# Patient Record
Sex: FEMALE | Race: WHITE | NOT HISPANIC OR LATINO
[De-identification: names, ages, dates, MRNs, and addresses within clinical notes are randomized per-mention and may not be internally consistent; named-entity substitution may affect disease eponyms.]

---

## 2021-01-01 ENCOUNTER — APPOINTMENT (OUTPATIENT)
Dept: INTERVENTIONAL RADIOLOGY/VASCULAR | Facility: CLINIC | Age: 71
End: 2021-01-01

## 2021-01-01 ENCOUNTER — OUTPATIENT (OUTPATIENT)
Dept: OUTPATIENT SERVICES | Facility: HOSPITAL | Age: 71
LOS: 1 days | Discharge: HOME | End: 2021-01-01

## 2021-01-01 ENCOUNTER — APPOINTMENT (OUTPATIENT)
Dept: MULTI SPECIALTY CLINIC | Facility: CLINIC | Age: 71
End: 2021-01-01
Payer: MEDICARE

## 2021-01-01 ENCOUNTER — NON-APPOINTMENT (OUTPATIENT)
Age: 71
End: 2021-01-01

## 2021-01-01 ENCOUNTER — LABORATORY RESULT (OUTPATIENT)
Age: 71
End: 2021-01-01

## 2021-01-01 ENCOUNTER — APPOINTMENT (OUTPATIENT)
Dept: HEPATOLOGY | Facility: CLINIC | Age: 71
End: 2021-01-01
Payer: MEDICARE

## 2021-01-01 VITALS
OXYGEN SATURATION: 98 % | SYSTOLIC BLOOD PRESSURE: 149 MMHG | RESPIRATION RATE: 14 BRPM | TEMPERATURE: 97.1 F | HEIGHT: 64 IN | DIASTOLIC BLOOD PRESSURE: 79 MMHG | HEART RATE: 85 BPM

## 2021-01-01 VITALS
HEART RATE: 84 BPM | WEIGHT: 245.82 LBS | BODY MASS INDEX: 44.1 KG/M2 | RESPIRATION RATE: 16 BRPM | SYSTOLIC BLOOD PRESSURE: 145 MMHG | HEIGHT: 62.6 IN | DIASTOLIC BLOOD PRESSURE: 79 MMHG | TEMPERATURE: 98.1 F | OXYGEN SATURATION: 96 %

## 2021-01-01 DIAGNOSIS — Z87.19 PERSONAL HISTORY OF OTHER DISEASES OF THE DIGESTIVE SYSTEM: ICD-10-CM

## 2021-01-01 DIAGNOSIS — F32.A ANXIETY DISORDER, UNSPECIFIED: ICD-10-CM

## 2021-01-01 DIAGNOSIS — Z87.39 PERSONAL HISTORY OF OTHER DISEASES OF THE MUSCULOSKELETAL SYSTEM AND CONNECTIVE TISSUE: ICD-10-CM

## 2021-01-01 DIAGNOSIS — K76.89 OTHER SPECIFIED DISEASES OF LIVER: ICD-10-CM

## 2021-01-01 DIAGNOSIS — I10 ESSENTIAL (PRIMARY) HYPERTENSION: ICD-10-CM

## 2021-01-01 DIAGNOSIS — Z78.9 OTHER SPECIFIED HEALTH STATUS: ICD-10-CM

## 2021-01-01 DIAGNOSIS — M17.0 BILATERAL PRIMARY OSTEOARTHRITIS OF KNEE: ICD-10-CM

## 2021-01-01 DIAGNOSIS — Z72.89 OTHER PROBLEMS RELATED TO LIFESTYLE: ICD-10-CM

## 2021-01-01 DIAGNOSIS — F41.9 ANXIETY DISORDER, UNSPECIFIED: ICD-10-CM

## 2021-01-01 DIAGNOSIS — M25.569 PAIN IN UNSPECIFIED KNEE: ICD-10-CM

## 2021-01-01 DIAGNOSIS — F32.A DEPRESSION, UNSPECIFIED: ICD-10-CM

## 2021-01-01 LAB
AFP-TM SERPL-MCNC: 19.8 NG/ML
ALBUMIN SERPL ELPH-MCNC: 3.8 G/DL
ALP BLD-CCNC: 149 U/L
ALT SERPL-CCNC: 33 U/L
ANION GAP SERPL CALC-SCNC: 9 MMOL/L
AST SERPL-CCNC: 72 U/L
BASOPHILS # BLD AUTO: 0.03 K/UL
BASOPHILS NFR BLD AUTO: 0.5 %
BILIRUB SERPL-MCNC: 1.1 MG/DL
BUN SERPL-MCNC: 10 MG/DL
CALCIUM SERPL-MCNC: 9.9 MG/DL
CANCER AG19-9 SERPL-ACNC: 59 U/ML
CHLORIDE SERPL-SCNC: 103 MMOL/L
CO2 SERPL-SCNC: 29 MMOL/L
COVID-19 SPIKE DOMAIN ANTIBODY INTERPRETATION: POSITIVE
CREAT SERPL-MCNC: 0.67 MG/DL
EOSINOPHIL # BLD AUTO: 0.07 K/UL
EOSINOPHIL NFR BLD AUTO: 1.3 %
ESTIMATED AVERAGE GLUCOSE: 100 MG/DL
FERRITIN SERPL-MCNC: 309 NG/ML
GGT SERPL-CCNC: 139 U/L
GLUCOSE SERPL-MCNC: 107 MG/DL
HBA1C MFR BLD HPLC: 5.1 %
HBV CORE IGG+IGM SER QL: NONREACTIVE
HBV SURFACE AB SER QL: REACTIVE
HBV SURFACE AG SER QL: NONREACTIVE
HCT VFR BLD CALC: 44.3 %
HCV AB SER QL: NONREACTIVE
HCV S/CO RATIO: 0.15 S/CO
HEPATITIS A IGG ANTIBODY: REACTIVE
HGB BLD-MCNC: 14 G/DL
IMM GRANULOCYTES NFR BLD AUTO: 0.2 %
LYMPHOCYTES # BLD AUTO: 0.97 K/UL
LYMPHOCYTES NFR BLD AUTO: 17.5 %
MAN DIFF?: NORMAL
MCHC RBC-ENTMCNC: 31.6 GM/DL
MCHC RBC-ENTMCNC: 33.3 PG
MCV RBC AUTO: 105.5 FL
MONOCYTES # BLD AUTO: 0.62 K/UL
MONOCYTES NFR BLD AUTO: 11.2 %
NEUTROPHILS # BLD AUTO: 3.84 K/UL
NEUTROPHILS NFR BLD AUTO: 69.3 %
PLATELET # BLD AUTO: 125 K/UL
POTASSIUM SERPL-SCNC: 4.4 MMOL/L
PROT SERPL-MCNC: 6.8 G/DL
RBC # BLD: 4.2 M/UL
RBC # FLD: 14.3 %
SARS-COV-2 AB SERPL IA-ACNC: >250 U/ML
SODIUM SERPL-SCNC: 140 MMOL/L
WBC # FLD AUTO: 5.54 K/UL

## 2021-01-01 PROCEDURE — 99205 OFFICE O/P NEW HI 60 MIN: CPT

## 2021-01-01 PROCEDURE — 99204 OFFICE O/P NEW MOD 45 MIN: CPT

## 2021-01-01 RX ORDER — PEG-3350, SODIUM SULFATE, SODIUM CHLORIDE, POTASSIUM CHLORIDE, SODIUM ASCORBATE AND ASCORBIC ACID 7.5-2.691G
100 KIT ORAL
Qty: 1 | Refills: 0 | Status: DISCONTINUED | COMMUNITY
Start: 2021-01-01 | End: 2021-01-01

## 2021-01-01 RX ORDER — LEVOCETIRIZINE DIHYDROCHLORIDE 5 MG/1
5 TABLET ORAL
Qty: 90 | Refills: 3 | Status: ACTIVE | COMMUNITY
Start: 2021-08-30

## 2021-01-01 RX ORDER — BUPROPION HYDROCHLORIDE 150 MG/1
150 TABLET, EXTENDED RELEASE ORAL
Refills: 0 | Status: ACTIVE | COMMUNITY
Start: 2021-08-06

## 2021-01-01 RX ORDER — DOCUSATE SODIUM 50 MG
50 CAPSULE ORAL
Refills: 0 | Status: ACTIVE | COMMUNITY

## 2021-01-01 RX ORDER — FOLIC ACID 1 MG/1
1 TABLET ORAL
Refills: 0 | Status: ACTIVE | COMMUNITY
Start: 2021-02-26

## 2021-01-01 RX ORDER — HYDROCHLOROTHIAZIDE 12.5 MG/1
12.5 TABLET ORAL DAILY
Refills: 0 | Status: ACTIVE | COMMUNITY
Start: 2021-01-01

## 2021-01-01 RX ORDER — TRAZODONE HYDROCHLORIDE 100 MG/1
100 TABLET ORAL DAILY
Refills: 0 | Status: ACTIVE | COMMUNITY
Start: 2021-08-06

## 2021-01-01 RX ORDER — ERGOCALCIFEROL 1.25 MG/1
1.25 MG CAPSULE, LIQUID FILLED ORAL
Refills: 0 | Status: ACTIVE | COMMUNITY
Start: 2021-10-25

## 2021-12-23 PROBLEM — Z00.00 ENCOUNTER FOR PREVENTIVE HEALTH EXAMINATION: Status: ACTIVE | Noted: 2021-01-01

## 2021-12-27 PROBLEM — Z78.9 HEAVY ALCOHOL USE: Status: ACTIVE | Noted: 2021-01-01

## 2021-12-27 NOTE — HISTORY OF PRESENT ILLNESS
[de-identified] : 70 y/o F w/ hx of obesity, HTN, s/p L knee replacement, debility due to knee issues here for elevated liver enzymes, fatty liver disease, WILEY + ETOH cirrhosis, concern for infiltrative HCC, and portal vein thrombosis.\par \par SH - drinks a glass of wine a night, used to drink a lot more 10-15 years ago > 4-5 drinks of vodka a night. \par FH - father w/ alcohol liver disease but no cancer\par never had EGD, had colonoscopy > 10 years ago.\par \par 11/26/21 BW - AST 64, ALT 34, TB 1.3, Cr 0.64. .6, , AFP 13. \par MRI 12/14/21 - nodular liver in contour, shrunken with caudate hypertrophy c/w hepatic cirrhosis. hepatic hemangioma measuring 1.6 cm in segment 8, suspicious nodule measuring 1.2 cm in segment 8 demonstrating washout on portal venous and equilibrium phase imaging suspicious for hepatoma with restricted diffusion, LIRADS 4/5. Patchy infiltrative arterial phase enhancement throughout segment 5, 8, 4A, 4B measuring 10.7 x 7.4 cm with suggestion of washout on portal venous and equilibrium phase highly suspicious for infiltrative HCC with restricted diffusion, LIRADS-5. main portal vein, RPV, and LPV likely thrombosed. splenorenal varices.

## 2021-12-27 NOTE — ASSESSMENT
[FreeTextEntry1] : 70 y/o F w/ hx of obesity, HTN, s/p L knee replacement, debility due to knee issues here for elevated liver enzymes, fatty liver disease, WILEY + ETOH cirrhosis, concern for infiltrative HCC, and portal vein thrombosis.\par \par PCP - Dr James David\par \par # Infiltrative HCC\par AFP 13, recheck now. Check CA 19-9\par CT chest, NM bone scan ordered\par Upload MRI from 12/2021 from [facility]\par Referral to oncology made by me\par Discuss at tumor board\par Schedule EGD for varices screen\par \par # Portal vein thrombosis, Right/left/main\par Suspect tumor thrombosis\par Defer anticoagulation until further notice\par \par # Cirrhosis Surveillance\par    > Esophageal / gastric varices - schedule egd + colonoscopy for colon cancer screen\par    > Ascites - none\par    > Hepatic encephalopathy - none\par    > HCC screening - see above\par    > Portal vein thrombosis - none\par    > HBV/HAV status - check\par    > Transplant candidate - unlikely due to extensive nature of HCC\par \par RTC 1 month\par \par

## 2021-12-27 NOTE — CONSULT LETTER
[Dear  ___] : Dear  [unfilled], [Consult Letter:] : I had the pleasure of evaluating your patient, [unfilled]. [( Thank you for referring [unfilled] for consultation for _____ )] : Thank you for referring [unfilled] for consultation for [unfilled] [Please see my note below.] : Please see my note below. [Consult Closing:] : Thank you very much for allowing me to participate in the care of this patient.  If you have any questions, please do not hesitate to contact me. [Sincerely,] : Sincerely, [FreeTextEntry2] : James David [FreeTextEntry3] : Dr. Dangelo Kruger MD\par  of Medicine\par [facility]\par [facility] / [facility]\par

## 2021-12-27 NOTE — PHYSICAL EXAM
[General Appearance - Alert] : alert [General Appearance - In No Acute Distress] : in no acute distress [Sclera] : the sclera and conjunctiva were normal [PERRL With Normal Accommodation] : pupils were equal in size, round, and reactive to light [Extraocular Movements] : extraocular movements were intact [Outer Ear] : the ears and nose were normal in appearance [Neck Appearance] : the appearance of the neck was normal [Oropharynx] : the oropharynx was normal [Neck Cervical Mass (___cm)] : no neck mass was observed [Jugular Venous Distention Increased] : there was no jugular-venous distention [Thyroid Diffuse Enlargement] : the thyroid was not enlarged [Thyroid Nodule] : there were no palpable thyroid nodules [Auscultation Breath Sounds / Voice Sounds] : lungs were clear to auscultation bilaterally [Heart Rate And Rhythm] : heart rate was normal and rhythm regular [Heart Sounds] : normal S1 and S2 [Heart Sounds Gallop] : no gallops [Murmurs] : no murmurs [Heart Sounds Pericardial Friction Rub] : no pericardial rub [Bowel Sounds] : normal bowel sounds [Abdomen Soft] : soft [Abdomen Tenderness] : non-tender [Abdomen Mass (___ Cm)] : no abdominal mass palpated [No CVA Tenderness] : no ~M costovertebral angle tenderness [No Spinal Tenderness] : no spinal tenderness [Abnormal Walk] : normal gait [Nail Clubbing] : no clubbing  or cyanosis of the fingernails [Musculoskeletal - Swelling] : no joint swelling seen [Motor Tone] : muscle strength and tone were normal [Skin Color & Pigmentation] : normal skin color and pigmentation [Skin Turgor] : normal skin turgor [] : no rash [Oriented To Time, Place, And Person] : oriented to person, place, and time [Impaired Insight] : insight and judgment were intact [Affect] : the affect was normal [Scleral Icterus] : No Scleral Icterus [Spider Angioma] : No spider angioma(s) were observed [Abdominal  Ascites] : no ascites [Ascites Fluid Wave] : no ascites fluid wave [Ascites Tense] : ascites is not tense [Asterixis] : no asterixis observed [Jaundice] : No jaundice [Depression] : no depression [Hallucinations] : ~T no ~M hallucinations

## 2021-12-30 PROBLEM — F32.A DEPRESSION, UNSPECIFIED DEPRESSION TYPE: Status: ACTIVE | Noted: 2021-01-01

## 2021-12-30 PROBLEM — M17.0 PRIMARY OSTEOARTHRITIS OF BOTH KNEES: Status: ACTIVE | Noted: 2021-01-01

## 2021-12-30 PROBLEM — Z72.89 ALCOHOL USE: Status: ACTIVE | Noted: 2021-01-01

## 2021-12-30 NOTE — REASON FOR VISIT
[Other Location: e.g. School (Enter Location, City,State)___] : at [unfilled], at the time of the visit. [Medical Office: ([facility])___] : at the medical office located in  [Verbal consent obtained from patient] : the patient, [unfilled]

## 2021-12-30 NOTE — PHYSICAL EXAM
[Alert] : alert [Normal Voice/Communication] : normal voice communication [Capable of only limited selfcare, confined to bed or chair more than 50% of waking hours] : Capable of only limited selfcare, confined to bed or chair more than 50% of waking hours

## 2021-12-30 NOTE — ASSESSMENT
[SIRT Procedure & Planning] : SIRT procedure and planning discussed at length [FreeTextEntry1] : This is a 72 y/o F w/ hx of obesity, HTN, s/p L knee replacement, debility due to knee issues here for elevated liver enzymes, fatty liver disease, WILEY + ETOH cirrhosis, PVT, and recent diagnosis of infiltrative HCC who presents today for consultation for liver directed therapy. \par  \par \par 1.  HCC\par - MRI from 12/14/21 demonstrates nodular liver in contour, shrunken with caudate hypertrophy c/w hepatic cirrhosis. Patchy infiltrative arterial phase enhancement throughout segment 5, 8, 4A, 4B measuring 10.7 x 7.4 cm with suggestion of washout on portal venous and equilibrium phase highly suspicious for infiltrative HCC with restricted diffusion, LIRADS-5. main portal vein, RPV, and LPV likely thrombosed. splenorenal varices. \par - imaging reviewed and discussed with pt\par - Recommending treatment with radioembolization \par - I reviewed the procedure, including the mapping angiogram, risks (infection, bleeding, elevated LFTs, VENKAT), benefits, alternatives, and expected post procedure course.\par - I reviewed post procedure radiation safety precautions\par - chest CT, scheduled for 1/5\par - NM bone scan, scheduled for 1/5\par - reviewed PPI, rx sent\par - pt will also need systemic therapy, will plan for IR intervention initially, then she is to f/u with Dr. Brock for further treatment\par - tissue sample requested by oncology to confirm tissue biology and help guide treatment options.  \par - will perform biopsy on day of mapping,  I reviewed the procedure, including the risks, benefits, alternatives, and expected post procedure course.\par - pt requesting I email her more information on the intervention\par \par 2.  PVT\par - likely secondary to tumor burden, a/c deferred by GI at this time\par - continue to follow with Dr. Kruger \par \par I have provided the patient the opportunity to ask questions and have answered them to their satisfaction.  They are encouraged to contact our office with any further questions, concerns, or issues.\par \par \par \par \par I have provided the patient the opportunity to ask questions and have answered them to their satisfaction.  They are encouraged to contact our office with any further questions, concerns, or issues.\par \par

## 2021-12-30 NOTE — HISTORY OF PRESENT ILLNESS
[FreeTextEntry1] : This is 72 y/o F w/ hx of obesity, HTN, s/p L knee replacement, debility due to knee issues here for elevated liver enzymes, fatty liver disease, WILEY + ETOH cirrhosis, concern for infiltrative HCC, and portal vein thrombosis who presents today for consultation for liver directed therapy. \par \par Pt was found to have infiltrative mass on recent imaging.  Was evaluated by Dr. Kruger earlier in week and referred to IR and oncology for treatment.  \par \par Pt planning to start systemic therapy with Dr. Brock in near future. \par \par PCP - Dr James David\par

## 2022-01-01 ENCOUNTER — APPOINTMENT (OUTPATIENT)
Dept: NUCLEAR MEDICINE | Facility: IMAGING CENTER | Age: 72
End: 2022-01-01
Payer: MEDICARE

## 2022-01-01 ENCOUNTER — RESULT REVIEW (OUTPATIENT)
Age: 72
End: 2022-01-01

## 2022-01-01 ENCOUNTER — APPOINTMENT (OUTPATIENT)
Dept: CT IMAGING | Facility: IMAGING CENTER | Age: 72
End: 2022-01-01
Payer: MEDICARE

## 2022-01-01 ENCOUNTER — APPOINTMENT (OUTPATIENT)
Dept: HEMATOLOGY ONCOLOGY | Facility: CLINIC | Age: 72
End: 2022-01-01

## 2022-01-01 ENCOUNTER — APPOINTMENT (OUTPATIENT)
Dept: INFUSION THERAPY | Facility: HOSPITAL | Age: 72
End: 2022-01-01

## 2022-01-01 ENCOUNTER — OUTPATIENT (OUTPATIENT)
Dept: OUTPATIENT SERVICES | Facility: HOSPITAL | Age: 72
LOS: 1 days | Discharge: HOME | End: 2022-01-01
Payer: MEDICARE

## 2022-01-01 ENCOUNTER — APPOINTMENT (OUTPATIENT)
Dept: NUCLEAR MEDICINE | Facility: HOSPITAL | Age: 72
End: 2022-01-01

## 2022-01-01 ENCOUNTER — OUTPATIENT (OUTPATIENT)
Dept: OUTPATIENT SERVICES | Facility: HOSPITAL | Age: 72
LOS: 1 days | Discharge: HOME | End: 2022-01-01

## 2022-01-01 ENCOUNTER — NON-APPOINTMENT (OUTPATIENT)
Age: 72
End: 2022-01-01

## 2022-01-01 ENCOUNTER — OUTPATIENT (OUTPATIENT)
Dept: OUTPATIENT SERVICES | Facility: HOSPITAL | Age: 72
LOS: 1 days | End: 2022-01-01
Payer: MEDICARE

## 2022-01-01 ENCOUNTER — RX RENEWAL (OUTPATIENT)
Age: 72
End: 2022-01-01

## 2022-01-01 ENCOUNTER — APPOINTMENT (OUTPATIENT)
Dept: HEPATOLOGY | Facility: HOSPITAL | Age: 72
End: 2022-01-01

## 2022-01-01 ENCOUNTER — TRANSCRIPTION ENCOUNTER (OUTPATIENT)
Age: 72
End: 2022-01-01

## 2022-01-01 ENCOUNTER — APPOINTMENT (OUTPATIENT)
Dept: MRI IMAGING | Facility: IMAGING CENTER | Age: 72
End: 2022-01-01
Payer: MEDICARE

## 2022-01-01 ENCOUNTER — APPOINTMENT (OUTPATIENT)
Dept: INTERVENTIONAL RADIOLOGY/VASCULAR | Facility: CLINIC | Age: 72
End: 2022-01-01
Payer: MEDICARE

## 2022-01-01 ENCOUNTER — APPOINTMENT (OUTPATIENT)
Dept: CT IMAGING | Facility: IMAGING CENTER | Age: 72
End: 2022-01-01

## 2022-01-01 ENCOUNTER — INPATIENT (INPATIENT)
Facility: HOSPITAL | Age: 72
LOS: 8 days | Discharge: NURSING, REHAB OR LONG-TERM CARE | DRG: 432 | End: 2022-08-02
Attending: INTERNAL MEDICINE | Admitting: INTERNAL MEDICINE
Payer: MEDICARE

## 2022-01-01 ENCOUNTER — RX CHANGE (OUTPATIENT)
Age: 72
End: 2022-01-01

## 2022-01-01 VITALS
RESPIRATION RATE: 16 BRPM | TEMPERATURE: 99 F | SYSTOLIC BLOOD PRESSURE: 145 MMHG | HEIGHT: 64 IN | DIASTOLIC BLOOD PRESSURE: 67 MMHG | HEART RATE: 78 BPM | OXYGEN SATURATION: 94 % | WEIGHT: 139.99 LBS

## 2022-01-01 VITALS
HEART RATE: 85 BPM | SYSTOLIC BLOOD PRESSURE: 116 MMHG | DIASTOLIC BLOOD PRESSURE: 60 MMHG | RESPIRATION RATE: 16 BRPM | TEMPERATURE: 98 F | HEIGHT: 64 IN | WEIGHT: 242.51 LBS | OXYGEN SATURATION: 95 %

## 2022-01-01 VITALS
RESPIRATION RATE: 16 BRPM | HEIGHT: 64 IN | SYSTOLIC BLOOD PRESSURE: 139 MMHG | TEMPERATURE: 98 F | WEIGHT: 250 LBS | OXYGEN SATURATION: 94 % | HEART RATE: 92 BPM | DIASTOLIC BLOOD PRESSURE: 56 MMHG

## 2022-01-01 VITALS
HEART RATE: 79 BPM | DIASTOLIC BLOOD PRESSURE: 78 MMHG | RESPIRATION RATE: 20 BRPM | OXYGEN SATURATION: 98 % | SYSTOLIC BLOOD PRESSURE: 145 MMHG

## 2022-01-01 VITALS
SYSTOLIC BLOOD PRESSURE: 140 MMHG | HEIGHT: 64 IN | RESPIRATION RATE: 18 BRPM | TEMPERATURE: 98 F | DIASTOLIC BLOOD PRESSURE: 70 MMHG | WEIGHT: 240.08 LBS | OXYGEN SATURATION: 94 % | HEART RATE: 76 BPM

## 2022-01-01 VITALS
SYSTOLIC BLOOD PRESSURE: 141 MMHG | RESPIRATION RATE: 18 BRPM | HEART RATE: 94 BPM | OXYGEN SATURATION: 99 % | TEMPERATURE: 97 F | HEIGHT: 64 IN | DIASTOLIC BLOOD PRESSURE: 79 MMHG | WEIGHT: 293 LBS

## 2022-01-01 VITALS
SYSTOLIC BLOOD PRESSURE: 140 MMHG | BODY MASS INDEX: 44.86 KG/M2 | RESPIRATION RATE: 16 BRPM | DIASTOLIC BLOOD PRESSURE: 79 MMHG | HEART RATE: 79 BPM | TEMPERATURE: 97.4 F | OXYGEN SATURATION: 93 % | WEIGHT: 250 LBS

## 2022-01-01 VITALS
HEIGHT: 64 IN | TEMPERATURE: 98 F | SYSTOLIC BLOOD PRESSURE: 140 MMHG | RESPIRATION RATE: 18 BRPM | HEART RATE: 76 BPM | OXYGEN SATURATION: 94 % | DIASTOLIC BLOOD PRESSURE: 70 MMHG | WEIGHT: 240.08 LBS

## 2022-01-01 VITALS
DIASTOLIC BLOOD PRESSURE: 76 MMHG | SYSTOLIC BLOOD PRESSURE: 152 MMHG | OXYGEN SATURATION: 95 % | RESPIRATION RATE: 20 BRPM | HEART RATE: 71 BPM

## 2022-01-01 VITALS
OXYGEN SATURATION: 95 % | SYSTOLIC BLOOD PRESSURE: 119 MMHG | DIASTOLIC BLOOD PRESSURE: 63 MMHG | RESPIRATION RATE: 18 BRPM | TEMPERATURE: 99 F | HEART RATE: 86 BPM

## 2022-01-01 VITALS
WEIGHT: 235.89 LBS | DIASTOLIC BLOOD PRESSURE: 73 MMHG | SYSTOLIC BLOOD PRESSURE: 156 MMHG | OXYGEN SATURATION: 96 % | HEIGHT: 64 IN | RESPIRATION RATE: 16 BRPM | TEMPERATURE: 98 F | HEART RATE: 85 BPM

## 2022-01-01 VITALS
TEMPERATURE: 97.4 F | DIASTOLIC BLOOD PRESSURE: 76 MMHG | SYSTOLIC BLOOD PRESSURE: 134 MMHG | RESPIRATION RATE: 16 BRPM | HEART RATE: 79 BPM | OXYGEN SATURATION: 99 %

## 2022-01-01 VITALS
SYSTOLIC BLOOD PRESSURE: 117 MMHG | OXYGEN SATURATION: 96 % | HEART RATE: 80 BPM | DIASTOLIC BLOOD PRESSURE: 66 MMHG | RESPIRATION RATE: 18 BRPM

## 2022-01-01 DIAGNOSIS — K75.81 NONALCOHOLIC STEATOHEPATITIS (NASH): ICD-10-CM

## 2022-01-01 DIAGNOSIS — Z96.652 PRESENCE OF LEFT ARTIFICIAL KNEE JOINT: Chronic | ICD-10-CM

## 2022-01-01 DIAGNOSIS — C22.0 LIVER CELL CARCINOMA: ICD-10-CM

## 2022-01-01 DIAGNOSIS — R11.2 NAUSEA WITH VOMITING, UNSPECIFIED: ICD-10-CM

## 2022-01-01 DIAGNOSIS — K76.89 OTHER SPECIFIED DISEASES OF LIVER: ICD-10-CM

## 2022-01-01 DIAGNOSIS — Z11.52 ENCOUNTER FOR SCREENING FOR COVID-19: ICD-10-CM

## 2022-01-01 DIAGNOSIS — K74.69 OTHER CIRRHOSIS OF LIVER: ICD-10-CM

## 2022-01-01 DIAGNOSIS — Z51.11 ENCOUNTER FOR ANTINEOPLASTIC CHEMOTHERAPY: ICD-10-CM

## 2022-01-01 DIAGNOSIS — Z01.818 ENCOUNTER FOR OTHER PREPROCEDURAL EXAMINATION: ICD-10-CM

## 2022-01-01 DIAGNOSIS — R17 UNSPECIFIED JAUNDICE: ICD-10-CM

## 2022-01-01 DIAGNOSIS — K76.9 LIVER DISEASE, UNSPECIFIED: ICD-10-CM

## 2022-01-01 DIAGNOSIS — R60.0 LOCALIZED EDEMA: ICD-10-CM

## 2022-01-01 DIAGNOSIS — I81 PORTAL VEIN THROMBOSIS: ICD-10-CM

## 2022-01-01 DIAGNOSIS — Z45.2 ENCOUNTER FOR ADJUSTMENT AND MANAGEMENT OF VASCULAR ACCESS DEVICE: ICD-10-CM

## 2022-01-01 LAB
AFP-TM SERPL-MCNC: 41.5 NG/ML
ALBUMIN SERPL ELPH-MCNC: 2.4 G/DL — LOW (ref 3.3–5)
ALBUMIN SERPL ELPH-MCNC: 2.5 G/DL — LOW (ref 3.3–5)
ALBUMIN SERPL ELPH-MCNC: 2.5 G/DL — LOW (ref 3.3–5)
ALBUMIN SERPL ELPH-MCNC: 2.6 G/DL — LOW (ref 3.3–5)
ALBUMIN SERPL ELPH-MCNC: 2.7 G/DL — LOW (ref 3.3–5)
ALBUMIN SERPL ELPH-MCNC: 2.8 G/DL — LOW (ref 3.3–5)
ALBUMIN SERPL ELPH-MCNC: 3 G/DL — LOW (ref 3.3–5)
ALBUMIN SERPL ELPH-MCNC: 3.2 G/DL — LOW (ref 3.3–5)
ALBUMIN SERPL ELPH-MCNC: 3.3 G/DL
ALBUMIN SERPL ELPH-MCNC: 3.4 G/DL — SIGNIFICANT CHANGE UP (ref 3.3–5)
ALBUMIN SERPL ELPH-MCNC: 3.6 G/DL — SIGNIFICANT CHANGE UP (ref 3.3–5)
ALP BLD-CCNC: 202 U/L
ALP SERPL-CCNC: 142 U/L — HIGH (ref 40–120)
ALP SERPL-CCNC: 151 U/L — HIGH (ref 40–120)
ALP SERPL-CCNC: 169 U/L — HIGH (ref 40–120)
ALP SERPL-CCNC: 174 U/L — HIGH (ref 40–120)
ALP SERPL-CCNC: 175 U/L — HIGH (ref 40–120)
ALP SERPL-CCNC: 175 U/L — HIGH (ref 40–120)
ALP SERPL-CCNC: 191 U/L — HIGH (ref 40–120)
ALP SERPL-CCNC: 197 U/L — HIGH (ref 40–120)
ALP SERPL-CCNC: 202 U/L — HIGH (ref 40–120)
ALP SERPL-CCNC: 218 U/L — HIGH (ref 40–120)
ALT FLD-CCNC: 34 U/L — SIGNIFICANT CHANGE UP (ref 10–45)
ALT FLD-CCNC: 35 U/L — SIGNIFICANT CHANGE UP (ref 10–45)
ALT FLD-CCNC: 36 U/L — SIGNIFICANT CHANGE UP (ref 10–45)
ALT FLD-CCNC: 37 U/L — SIGNIFICANT CHANGE UP (ref 10–45)
ALT FLD-CCNC: 38 U/L — SIGNIFICANT CHANGE UP (ref 10–45)
ALT FLD-CCNC: 41 U/L — SIGNIFICANT CHANGE UP (ref 10–45)
ALT FLD-CCNC: 42 U/L — SIGNIFICANT CHANGE UP (ref 10–45)
ALT FLD-CCNC: 44 U/L — SIGNIFICANT CHANGE UP (ref 10–45)
ALT SERPL-CCNC: 33 U/L
ANION GAP SERPL CALC-SCNC: 10 MMOL/L — SIGNIFICANT CHANGE UP (ref 5–17)
ANION GAP SERPL CALC-SCNC: 10 MMOL/L — SIGNIFICANT CHANGE UP (ref 5–17)
ANION GAP SERPL CALC-SCNC: 11 MMOL/L — SIGNIFICANT CHANGE UP (ref 5–17)
ANION GAP SERPL CALC-SCNC: 12 MMOL/L — SIGNIFICANT CHANGE UP (ref 5–17)
ANION GAP SERPL CALC-SCNC: 12 MMOL/L — SIGNIFICANT CHANGE UP (ref 5–17)
ANION GAP SERPL CALC-SCNC: 13 MMOL/L
ANION GAP SERPL CALC-SCNC: 13 MMOL/L — SIGNIFICANT CHANGE UP (ref 5–17)
ANION GAP SERPL CALC-SCNC: 13 MMOL/L — SIGNIFICANT CHANGE UP (ref 5–17)
ANION GAP SERPL CALC-SCNC: 14 MMOL/L — SIGNIFICANT CHANGE UP (ref 5–17)
ANION GAP SERPL CALC-SCNC: 8 MMOL/L — SIGNIFICANT CHANGE UP (ref 5–17)
ANION GAP SERPL CALC-SCNC: 9 MMOL/L — SIGNIFICANT CHANGE UP (ref 5–17)
ANION GAP SERPL CALC-SCNC: 9 MMOL/L — SIGNIFICANT CHANGE UP (ref 5–17)
APTT BLD: 35.6 SEC — HIGH (ref 27.5–35.5)
APTT BLD: 36.2 SEC — HIGH (ref 27.5–35.5)
APTT BLD: 37.2 SEC
APTT BLD: 39.9 SEC — HIGH (ref 27.5–35.5)
APTT BLD: 40.7 SEC — HIGH (ref 27.5–35.5)
AST SERPL-CCNC: 104 U/L — HIGH (ref 10–40)
AST SERPL-CCNC: 112 U/L
AST SERPL-CCNC: 114 U/L — HIGH (ref 10–40)
AST SERPL-CCNC: 118 U/L — HIGH (ref 10–40)
AST SERPL-CCNC: 118 U/L — HIGH (ref 10–40)
AST SERPL-CCNC: 123 U/L — HIGH (ref 10–40)
AST SERPL-CCNC: 128 U/L — HIGH (ref 10–40)
AST SERPL-CCNC: 135 U/L — HIGH (ref 10–40)
AST SERPL-CCNC: 158 U/L — HIGH (ref 10–40)
AST SERPL-CCNC: 78 U/L — HIGH (ref 10–40)
AST SERPL-CCNC: 83 U/L — HIGH (ref 10–40)
BASOPHILS # BLD AUTO: 0 K/UL — SIGNIFICANT CHANGE UP (ref 0–0.2)
BASOPHILS # BLD AUTO: 0.03 K/UL — SIGNIFICANT CHANGE UP (ref 0–0.2)
BASOPHILS # BLD AUTO: 0.03 K/UL — SIGNIFICANT CHANGE UP (ref 0–0.2)
BASOPHILS # BLD AUTO: 0.04 K/UL — SIGNIFICANT CHANGE UP (ref 0–0.2)
BASOPHILS # BLD AUTO: 0.04 K/UL — SIGNIFICANT CHANGE UP (ref 0–0.2)
BASOPHILS # BLD AUTO: 0.05 K/UL — SIGNIFICANT CHANGE UP (ref 0–0.2)
BASOPHILS NFR BLD AUTO: 0 % — SIGNIFICANT CHANGE UP (ref 0–2)
BASOPHILS NFR BLD AUTO: 0.4 % — SIGNIFICANT CHANGE UP (ref 0–2)
BASOPHILS NFR BLD AUTO: 0.4 % — SIGNIFICANT CHANGE UP (ref 0–2)
BASOPHILS NFR BLD AUTO: 0.5 % — SIGNIFICANT CHANGE UP (ref 0–2)
BILIRUB SERPL-MCNC: 1.1 MG/DL — SIGNIFICANT CHANGE UP (ref 0.2–1.2)
BILIRUB SERPL-MCNC: 1.8 MG/DL — HIGH (ref 0.2–1.2)
BILIRUB SERPL-MCNC: 2.4 MG/DL
BILIRUB SERPL-MCNC: 3 MG/DL — HIGH (ref 0.2–1.2)
BILIRUB SERPL-MCNC: 3.2 MG/DL — HIGH (ref 0.2–1.2)
BILIRUB SERPL-MCNC: 3.7 MG/DL — HIGH (ref 0.2–1.2)
BILIRUB SERPL-MCNC: 4.2 MG/DL — HIGH (ref 0.2–1.2)
BILIRUB SERPL-MCNC: 4.4 MG/DL — HIGH (ref 0.2–1.2)
BILIRUB SERPL-MCNC: 4.4 MG/DL — HIGH (ref 0.2–1.2)
BILIRUB SERPL-MCNC: 4.8 MG/DL — HIGH (ref 0.2–1.2)
BILIRUB SERPL-MCNC: 5.7 MG/DL — HIGH (ref 0.2–1.2)
BILIRUB SERPL-MCNC: 7 MG/DL — HIGH (ref 0.2–1.2)
BLD GP AB SCN SERPL QL: NEGATIVE — SIGNIFICANT CHANGE UP
BUN SERPL-MCNC: 10 MG/DL — SIGNIFICANT CHANGE UP (ref 7–23)
BUN SERPL-MCNC: 10 MG/DL — SIGNIFICANT CHANGE UP (ref 7–23)
BUN SERPL-MCNC: 11 MG/DL
BUN SERPL-MCNC: 12 MG/DL — SIGNIFICANT CHANGE UP (ref 7–23)
BUN SERPL-MCNC: 12 MG/DL — SIGNIFICANT CHANGE UP (ref 7–23)
BUN SERPL-MCNC: 13 MG/DL — SIGNIFICANT CHANGE UP (ref 7–23)
BUN SERPL-MCNC: 13 MG/DL — SIGNIFICANT CHANGE UP (ref 7–23)
BUN SERPL-MCNC: 14 MG/DL — SIGNIFICANT CHANGE UP (ref 7–23)
BUN SERPL-MCNC: 14 MG/DL — SIGNIFICANT CHANGE UP (ref 7–23)
BUN SERPL-MCNC: 15 MG/DL — SIGNIFICANT CHANGE UP (ref 7–23)
BUN SERPL-MCNC: 21 MG/DL — SIGNIFICANT CHANGE UP (ref 7–23)
BUN SERPL-MCNC: 22 MG/DL — SIGNIFICANT CHANGE UP (ref 7–23)
CALCIUM SERPL-MCNC: 8 MG/DL — LOW (ref 8.4–10.5)
CALCIUM SERPL-MCNC: 8.1 MG/DL — LOW (ref 8.4–10.5)
CALCIUM SERPL-MCNC: 8.3 MG/DL — LOW (ref 8.4–10.5)
CALCIUM SERPL-MCNC: 8.4 MG/DL — SIGNIFICANT CHANGE UP (ref 8.4–10.5)
CALCIUM SERPL-MCNC: 8.5 MG/DL — SIGNIFICANT CHANGE UP (ref 8.4–10.5)
CALCIUM SERPL-MCNC: 8.5 MG/DL — SIGNIFICANT CHANGE UP (ref 8.4–10.5)
CALCIUM SERPL-MCNC: 8.6 MG/DL — SIGNIFICANT CHANGE UP (ref 8.4–10.5)
CALCIUM SERPL-MCNC: 8.6 MG/DL — SIGNIFICANT CHANGE UP (ref 8.4–10.5)
CALCIUM SERPL-MCNC: 8.8 MG/DL — SIGNIFICANT CHANGE UP (ref 8.4–10.5)
CALCIUM SERPL-MCNC: 8.8 MG/DL — SIGNIFICANT CHANGE UP (ref 8.4–10.5)
CALCIUM SERPL-MCNC: 9.1 MG/DL — SIGNIFICANT CHANGE UP (ref 8.4–10.5)
CALCIUM SERPL-MCNC: 9.1 MG/DL — SIGNIFICANT CHANGE UP (ref 8.4–10.5)
CALCIUM SERPL-MCNC: 9.6 MG/DL
CALCIUM SERPL-MCNC: 9.6 MG/DL — SIGNIFICANT CHANGE UP (ref 8.4–10.5)
CHLORIDE SERPL-SCNC: 100 MMOL/L — SIGNIFICANT CHANGE UP (ref 96–108)
CHLORIDE SERPL-SCNC: 102 MMOL/L
CHLORIDE SERPL-SCNC: 102 MMOL/L — SIGNIFICANT CHANGE UP (ref 96–108)
CHLORIDE SERPL-SCNC: 105 MMOL/L — SIGNIFICANT CHANGE UP (ref 96–108)
CHLORIDE SERPL-SCNC: 93 MMOL/L — LOW (ref 96–108)
CHLORIDE SERPL-SCNC: 95 MMOL/L — LOW (ref 96–108)
CHLORIDE SERPL-SCNC: 96 MMOL/L — SIGNIFICANT CHANGE UP (ref 96–108)
CHLORIDE SERPL-SCNC: 96 MMOL/L — SIGNIFICANT CHANGE UP (ref 96–108)
CHLORIDE SERPL-SCNC: 97 MMOL/L — SIGNIFICANT CHANGE UP (ref 96–108)
CHLORIDE SERPL-SCNC: 98 MMOL/L — SIGNIFICANT CHANGE UP (ref 96–108)
CHLORIDE SERPL-SCNC: 99 MMOL/L — SIGNIFICANT CHANGE UP (ref 96–108)
CO2 SERPL-SCNC: 22 MMOL/L — SIGNIFICANT CHANGE UP (ref 22–31)
CO2 SERPL-SCNC: 23 MMOL/L — SIGNIFICANT CHANGE UP (ref 22–31)
CO2 SERPL-SCNC: 24 MMOL/L
CO2 SERPL-SCNC: 24 MMOL/L — SIGNIFICANT CHANGE UP (ref 22–31)
CO2 SERPL-SCNC: 25 MMOL/L — SIGNIFICANT CHANGE UP (ref 22–31)
CO2 SERPL-SCNC: 26 MMOL/L — SIGNIFICANT CHANGE UP (ref 22–31)
CO2 SERPL-SCNC: 27 MMOL/L — SIGNIFICANT CHANGE UP (ref 22–31)
CO2 SERPL-SCNC: 27 MMOL/L — SIGNIFICANT CHANGE UP (ref 22–31)
CO2 SERPL-SCNC: 31 MMOL/L — SIGNIFICANT CHANGE UP (ref 22–31)
CO2 SERPL-SCNC: 31 MMOL/L — SIGNIFICANT CHANGE UP (ref 22–31)
CO2 SERPL-SCNC: 32 MMOL/L — HIGH (ref 22–31)
CO2 SERPL-SCNC: 35 MMOL/L — HIGH (ref 22–31)
CREAT SERPL-MCNC: 0.57 MG/DL — SIGNIFICANT CHANGE UP (ref 0.5–1.3)
CREAT SERPL-MCNC: 0.59 MG/DL — SIGNIFICANT CHANGE UP (ref 0.5–1.3)
CREAT SERPL-MCNC: 0.6 MG/DL — SIGNIFICANT CHANGE UP (ref 0.5–1.3)
CREAT SERPL-MCNC: 0.6 MG/DL — SIGNIFICANT CHANGE UP (ref 0.5–1.3)
CREAT SERPL-MCNC: 0.62 MG/DL
CREAT SERPL-MCNC: 0.62 MG/DL — SIGNIFICANT CHANGE UP (ref 0.5–1.3)
CREAT SERPL-MCNC: 0.62 MG/DL — SIGNIFICANT CHANGE UP (ref 0.5–1.3)
CREAT SERPL-MCNC: 0.63 MG/DL — SIGNIFICANT CHANGE UP (ref 0.5–1.3)
CREAT SERPL-MCNC: 0.63 MG/DL — SIGNIFICANT CHANGE UP (ref 0.5–1.3)
CREAT SERPL-MCNC: 0.64 MG/DL — SIGNIFICANT CHANGE UP (ref 0.5–1.3)
CREAT SERPL-MCNC: 0.67 MG/DL — SIGNIFICANT CHANGE UP (ref 0.5–1.3)
CREAT SERPL-MCNC: 0.67 MG/DL — SIGNIFICANT CHANGE UP (ref 0.5–1.3)
CREAT SERPL-MCNC: 0.7 MG/DL — SIGNIFICANT CHANGE UP (ref 0.5–1.3)
CREAT SERPL-MCNC: 0.83 MG/DL — SIGNIFICANT CHANGE UP (ref 0.5–1.3)
CREAT SERPL-MCNC: 0.87 MG/DL — SIGNIFICANT CHANGE UP (ref 0.5–1.3)
EGFR: 72 ML/MIN/1.73M2 — SIGNIFICANT CHANGE UP
EGFR: 76 ML/MIN/1.73M2 — SIGNIFICANT CHANGE UP
EGFR: 94 ML/MIN/1.73M2 — SIGNIFICANT CHANGE UP
EGFR: 95 ML/MIN/1.73M2 — SIGNIFICANT CHANGE UP
EGFR: 96 ML/MIN/1.73M2
EGFR: 96 ML/MIN/1.73M2 — SIGNIFICANT CHANGE UP
EGFR: 97 ML/MIN/1.73M2 — SIGNIFICANT CHANGE UP
EGFR: 98 ML/MIN/1.73M2 — SIGNIFICANT CHANGE UP
EOSINOPHIL # BLD AUTO: 0.07 K/UL — SIGNIFICANT CHANGE UP (ref 0–0.5)
EOSINOPHIL # BLD AUTO: 0.07 K/UL — SIGNIFICANT CHANGE UP (ref 0–0.5)
EOSINOPHIL # BLD AUTO: 0.08 K/UL — SIGNIFICANT CHANGE UP (ref 0–0.5)
EOSINOPHIL NFR BLD AUTO: 0.9 % — SIGNIFICANT CHANGE UP (ref 0–6)
EOSINOPHIL NFR BLD AUTO: 1 % — SIGNIFICANT CHANGE UP (ref 0–6)
EOSINOPHIL NFR BLD AUTO: 1 % — SIGNIFICANT CHANGE UP (ref 0–6)
EOSINOPHIL NFR BLD AUTO: 1.1 % — SIGNIFICANT CHANGE UP (ref 0–6)
GLUCOSE BLDC GLUCOMTR-MCNC: 117 MG/DL — HIGH (ref 70–99)
GLUCOSE SERPL-MCNC: 105 MG/DL — HIGH (ref 70–99)
GLUCOSE SERPL-MCNC: 107 MG/DL — HIGH (ref 70–99)
GLUCOSE SERPL-MCNC: 124 MG/DL — HIGH (ref 70–99)
GLUCOSE SERPL-MCNC: 125 MG/DL
GLUCOSE SERPL-MCNC: 130 MG/DL — HIGH (ref 70–99)
GLUCOSE SERPL-MCNC: 132 MG/DL — HIGH (ref 70–99)
GLUCOSE SERPL-MCNC: 136 MG/DL — HIGH (ref 70–99)
GLUCOSE SERPL-MCNC: 142 MG/DL — HIGH (ref 70–99)
GLUCOSE SERPL-MCNC: 81 MG/DL — SIGNIFICANT CHANGE UP (ref 70–99)
GLUCOSE SERPL-MCNC: 87 MG/DL — SIGNIFICANT CHANGE UP (ref 70–99)
GLUCOSE SERPL-MCNC: 90 MG/DL — SIGNIFICANT CHANGE UP (ref 70–99)
GLUCOSE SERPL-MCNC: 92 MG/DL — SIGNIFICANT CHANGE UP (ref 70–99)
GLUCOSE SERPL-MCNC: 97 MG/DL — SIGNIFICANT CHANGE UP (ref 70–99)
GLUCOSE SERPL-MCNC: 97 MG/DL — SIGNIFICANT CHANGE UP (ref 70–99)
HCT VFR BLD CALC: 35 % — SIGNIFICANT CHANGE UP (ref 34.5–45)
HCT VFR BLD CALC: 39.3 % — SIGNIFICANT CHANGE UP (ref 34.5–45)
HCT VFR BLD CALC: 39.4 % — SIGNIFICANT CHANGE UP (ref 34.5–45)
HCT VFR BLD CALC: 39.7 % — SIGNIFICANT CHANGE UP (ref 34.5–45)
HCT VFR BLD CALC: 40 % — SIGNIFICANT CHANGE UP (ref 34.5–45)
HCT VFR BLD CALC: 40.1 % — SIGNIFICANT CHANGE UP (ref 34.5–45)
HCT VFR BLD CALC: 40.5 % — SIGNIFICANT CHANGE UP (ref 34.5–45)
HCT VFR BLD CALC: 40.8 % — SIGNIFICANT CHANGE UP (ref 34.5–45)
HCT VFR BLD CALC: 42.1 % — SIGNIFICANT CHANGE UP (ref 34.5–45)
HCT VFR BLD CALC: 42.3 % — SIGNIFICANT CHANGE UP (ref 34.5–45)
HCT VFR BLD CALC: 42.5 % — SIGNIFICANT CHANGE UP (ref 34.5–45)
HCT VFR BLD CALC: 42.7 % — SIGNIFICANT CHANGE UP (ref 34.5–45)
HCT VFR BLD CALC: 44.6 % — SIGNIFICANT CHANGE UP (ref 34.5–45)
HCV AB S/CO SERPL IA: 0.14 S/CO — SIGNIFICANT CHANGE UP (ref 0–0.99)
HCV AB SERPL-IMP: SIGNIFICANT CHANGE UP
HGB BLD-MCNC: 12 G/DL — SIGNIFICANT CHANGE UP (ref 11.5–15.5)
HGB BLD-MCNC: 13.2 G/DL — SIGNIFICANT CHANGE UP (ref 11.5–15.5)
HGB BLD-MCNC: 13.3 G/DL — SIGNIFICANT CHANGE UP (ref 11.5–15.5)
HGB BLD-MCNC: 13.3 G/DL — SIGNIFICANT CHANGE UP (ref 11.5–15.5)
HGB BLD-MCNC: 13.4 G/DL — SIGNIFICANT CHANGE UP (ref 11.5–15.5)
HGB BLD-MCNC: 13.6 G/DL — SIGNIFICANT CHANGE UP (ref 11.5–15.5)
HGB BLD-MCNC: 13.7 G/DL — SIGNIFICANT CHANGE UP (ref 11.5–15.5)
HGB BLD-MCNC: 13.7 G/DL — SIGNIFICANT CHANGE UP (ref 11.5–15.5)
HGB BLD-MCNC: 13.8 G/DL — SIGNIFICANT CHANGE UP (ref 11.5–15.5)
HGB BLD-MCNC: 13.9 G/DL — SIGNIFICANT CHANGE UP (ref 11.5–15.5)
HGB BLD-MCNC: 14 G/DL — SIGNIFICANT CHANGE UP (ref 11.5–15.5)
HGB BLD-MCNC: 14.2 G/DL — SIGNIFICANT CHANGE UP (ref 11.5–15.5)
HGB BLD-MCNC: 15 G/DL — SIGNIFICANT CHANGE UP (ref 11.5–15.5)
IMM GRANULOCYTES NFR BLD AUTO: 0.3 % — SIGNIFICANT CHANGE UP (ref 0–1.5)
IMM GRANULOCYTES NFR BLD AUTO: 0.3 % — SIGNIFICANT CHANGE UP (ref 0–1.5)
IMM GRANULOCYTES NFR BLD AUTO: 0.4 % — SIGNIFICANT CHANGE UP (ref 0–1.5)
IMM GRANULOCYTES NFR BLD AUTO: 0.4 % — SIGNIFICANT CHANGE UP (ref 0–1.5)
IMM GRANULOCYTES NFR BLD AUTO: 0.7 % — SIGNIFICANT CHANGE UP (ref 0–1.5)
INR BLD: 1.12 RATIO — SIGNIFICANT CHANGE UP (ref 0.88–1.16)
INR BLD: 1.14 RATIO — SIGNIFICANT CHANGE UP (ref 0.88–1.16)
INR BLD: 1.27 RATIO — HIGH (ref 0.88–1.16)
INR BLD: 1.32 RATIO — HIGH (ref 0.88–1.16)
INR BLD: 1.33 RATIO — HIGH (ref 0.88–1.16)
INR PPP: 1.2 RATIO
LYMPHOCYTES # BLD AUTO: 0.5 K/UL — LOW (ref 1–3.3)
LYMPHOCYTES # BLD AUTO: 0.56 K/UL — LOW (ref 1–3.3)
LYMPHOCYTES # BLD AUTO: 0.63 K/UL — LOW (ref 1–3.3)
LYMPHOCYTES # BLD AUTO: 0.66 K/UL — LOW (ref 1–3.3)
LYMPHOCYTES # BLD AUTO: 0.69 K/UL — LOW (ref 1–3.3)
LYMPHOCYTES # BLD AUTO: 0.72 K/UL — LOW (ref 1–3.3)
LYMPHOCYTES # BLD AUTO: 6.9 % — LOW (ref 13–44)
LYMPHOCYTES # BLD AUTO: 6.9 % — LOW (ref 13–44)
LYMPHOCYTES # BLD AUTO: 7.8 % — LOW (ref 13–44)
LYMPHOCYTES # BLD AUTO: 8 % — LOW (ref 13–44)
LYMPHOCYTES # BLD AUTO: 8.7 % — LOW (ref 13–44)
LYMPHOCYTES # BLD AUTO: 9 % — LOW (ref 13–44)
MACROCYTES BLD QL: SLIGHT — SIGNIFICANT CHANGE UP
MAGNESIUM SERPL-MCNC: 1.9 MG/DL — SIGNIFICANT CHANGE UP (ref 1.6–2.6)
MAGNESIUM SERPL-MCNC: 2 MG/DL — SIGNIFICANT CHANGE UP (ref 1.6–2.6)
MANUAL SMEAR VERIFICATION: SIGNIFICANT CHANGE UP
MCHC RBC-ENTMCNC: 32.3 GM/DL — SIGNIFICANT CHANGE UP (ref 32–36)
MCHC RBC-ENTMCNC: 32.5 GM/DL — SIGNIFICANT CHANGE UP (ref 32–36)
MCHC RBC-ENTMCNC: 33.1 G/DL — SIGNIFICANT CHANGE UP (ref 32–36)
MCHC RBC-ENTMCNC: 33.1 PG — SIGNIFICANT CHANGE UP (ref 27–34)
MCHC RBC-ENTMCNC: 33.2 GM/DL — SIGNIFICANT CHANGE UP (ref 32–36)
MCHC RBC-ENTMCNC: 33.4 G/DL — SIGNIFICANT CHANGE UP (ref 32–36)
MCHC RBC-ENTMCNC: 33.4 GM/DL — SIGNIFICANT CHANGE UP (ref 32–36)
MCHC RBC-ENTMCNC: 33.6 G/DL — SIGNIFICANT CHANGE UP (ref 32–36)
MCHC RBC-ENTMCNC: 33.6 G/DL — SIGNIFICANT CHANGE UP (ref 32–36)
MCHC RBC-ENTMCNC: 33.7 G/DL — SIGNIFICANT CHANGE UP (ref 32–36)
MCHC RBC-ENTMCNC: 33.8 GM/DL — SIGNIFICANT CHANGE UP (ref 32–36)
MCHC RBC-ENTMCNC: 33.8 GM/DL — SIGNIFICANT CHANGE UP (ref 32–36)
MCHC RBC-ENTMCNC: 33.8 PG — SIGNIFICANT CHANGE UP (ref 27–34)
MCHC RBC-ENTMCNC: 34.1 GM/DL — SIGNIFICANT CHANGE UP (ref 32–36)
MCHC RBC-ENTMCNC: 34.3 GM/DL — SIGNIFICANT CHANGE UP (ref 32–36)
MCHC RBC-ENTMCNC: 34.8 PG — HIGH (ref 27–34)
MCHC RBC-ENTMCNC: 35.1 PG — HIGH (ref 27–34)
MCHC RBC-ENTMCNC: 35.1 PG — HIGH (ref 27–34)
MCHC RBC-ENTMCNC: 35.6 PG — HIGH (ref 27–34)
MCHC RBC-ENTMCNC: 35.7 PG — HIGH (ref 27–34)
MCHC RBC-ENTMCNC: 35.8 PG — HIGH (ref 27–34)
MCHC RBC-ENTMCNC: 36.1 PG — HIGH (ref 27–34)
MCHC RBC-ENTMCNC: 36.3 PG — HIGH (ref 27–34)
MCHC RBC-ENTMCNC: 36.4 PG — HIGH (ref 27–34)
MCV RBC AUTO: 101.5 FL — HIGH (ref 80–100)
MCV RBC AUTO: 101.7 FL — HIGH (ref 80–100)
MCV RBC AUTO: 105 FL — HIGH (ref 80–100)
MCV RBC AUTO: 105.2 FL — HIGH (ref 80–100)
MCV RBC AUTO: 105.6 FL — HIGH (ref 80–100)
MCV RBC AUTO: 105.9 FL — HIGH (ref 80–100)
MCV RBC AUTO: 106 FL — HIGH (ref 80–100)
MCV RBC AUTO: 106.3 FL — HIGH (ref 80–100)
MCV RBC AUTO: 106.4 FL — HIGH (ref 80–100)
MCV RBC AUTO: 106.5 FL — HIGH (ref 80–100)
MCV RBC AUTO: 107.2 FL — HIGH (ref 80–100)
MCV RBC AUTO: 107.6 FL — HIGH (ref 80–100)
MCV RBC AUTO: 107.7 FL — HIGH (ref 80–100)
MELD SCORE WITH DIALYSIS: 28 POINTS — SIGNIFICANT CHANGE UP
MELD SCORE WITHOUT DIALYSIS: 14 POINTS — SIGNIFICANT CHANGE UP
MONOCYTES # BLD AUTO: 0.63 K/UL — SIGNIFICANT CHANGE UP (ref 0–0.9)
MONOCYTES # BLD AUTO: 0.68 K/UL — SIGNIFICANT CHANGE UP (ref 0–0.9)
MONOCYTES # BLD AUTO: 0.81 K/UL — SIGNIFICANT CHANGE UP (ref 0–0.9)
MONOCYTES # BLD AUTO: 0.85 K/UL — SIGNIFICANT CHANGE UP (ref 0–0.9)
MONOCYTES # BLD AUTO: 0.85 K/UL — SIGNIFICANT CHANGE UP (ref 0–0.9)
MONOCYTES # BLD AUTO: 0.95 K/UL — HIGH (ref 0–0.9)
MONOCYTES NFR BLD AUTO: 10.7 % — SIGNIFICANT CHANGE UP (ref 2–14)
MONOCYTES NFR BLD AUTO: 12 % — SIGNIFICANT CHANGE UP (ref 2–14)
MONOCYTES NFR BLD AUTO: 8.8 % — SIGNIFICANT CHANGE UP (ref 2–14)
MONOCYTES NFR BLD AUTO: 9.3 % — SIGNIFICANT CHANGE UP (ref 2–14)
MONOCYTES NFR BLD AUTO: 9.4 % — SIGNIFICANT CHANGE UP (ref 2–14)
MONOCYTES NFR BLD AUTO: 9.9 % — SIGNIFICANT CHANGE UP (ref 2–14)
NEUTROPHILS # BLD AUTO: 5.81 K/UL — SIGNIFICANT CHANGE UP (ref 1.8–7.4)
NEUTROPHILS # BLD AUTO: 5.95 K/UL — SIGNIFICANT CHANGE UP (ref 1.8–7.4)
NEUTROPHILS # BLD AUTO: 5.98 K/UL — SIGNIFICANT CHANGE UP (ref 1.8–7.4)
NEUTROPHILS # BLD AUTO: 6.2 K/UL — SIGNIFICANT CHANGE UP (ref 1.8–7.4)
NEUTROPHILS # BLD AUTO: 6.91 K/UL — SIGNIFICANT CHANGE UP (ref 1.8–7.4)
NEUTROPHILS # BLD AUTO: 7.47 K/UL — HIGH (ref 1.8–7.4)
NEUTROPHILS NFR BLD AUTO: 78 % — HIGH (ref 43–77)
NEUTROPHILS NFR BLD AUTO: 78.8 % — HIGH (ref 43–77)
NEUTROPHILS NFR BLD AUTO: 80.4 % — HIGH (ref 43–77)
NEUTROPHILS NFR BLD AUTO: 81.5 % — HIGH (ref 43–77)
NEUTROPHILS NFR BLD AUTO: 81.7 % — HIGH (ref 43–77)
NEUTROPHILS NFR BLD AUTO: 82 % — HIGH (ref 43–77)
NON-GYNECOLOGICAL CYTOLOGY STUDY: SIGNIFICANT CHANGE UP
NRBC # BLD: 0 /100 WBCS — SIGNIFICANT CHANGE UP (ref 0–0)
NRBC # BLD: 0 /100 — SIGNIFICANT CHANGE UP (ref 0–0)
NT-PROBNP SERPL-SCNC: 207 PG/ML — SIGNIFICANT CHANGE UP (ref 0–300)
PHOSPHATIDYETHANOL (PETH), WHOLE BLOOD: 83 NG/ML
PLAT MORPH BLD: NORMAL — SIGNIFICANT CHANGE UP
PLATELET # BLD AUTO: 103 K/UL — LOW (ref 150–400)
PLATELET # BLD AUTO: 104 K/UL — LOW (ref 150–400)
PLATELET # BLD AUTO: 106 K/UL — LOW (ref 150–400)
PLATELET # BLD AUTO: 118 K/UL — LOW (ref 150–400)
PLATELET # BLD AUTO: 119 K/UL — LOW (ref 150–400)
PLATELET # BLD AUTO: 119 K/UL — LOW (ref 150–400)
PLATELET # BLD AUTO: 121 K/UL — LOW (ref 150–400)
PLATELET # BLD AUTO: 129 K/UL — LOW (ref 150–400)
PLATELET # BLD AUTO: 129 K/UL — LOW (ref 150–400)
PLATELET # BLD AUTO: 133 K/UL — LOW (ref 150–400)
PLATELET # BLD AUTO: 133 K/UL — LOW (ref 150–400)
PLATELET # BLD AUTO: 135 K/UL — LOW (ref 150–400)
PLATELET # BLD AUTO: 141 K/UL — LOW (ref 150–400)
POLYCHROMASIA BLD QL SMEAR: SLIGHT — SIGNIFICANT CHANGE UP
POTASSIUM SERPL-MCNC: 2.8 MMOL/L — CRITICAL LOW (ref 3.5–5.3)
POTASSIUM SERPL-MCNC: 3.1 MMOL/L — LOW (ref 3.5–5.3)
POTASSIUM SERPL-MCNC: 3.5 MMOL/L — SIGNIFICANT CHANGE UP (ref 3.5–5.3)
POTASSIUM SERPL-MCNC: 3.8 MMOL/L — SIGNIFICANT CHANGE UP (ref 3.5–5.3)
POTASSIUM SERPL-MCNC: 4 MMOL/L — SIGNIFICANT CHANGE UP (ref 3.5–5.3)
POTASSIUM SERPL-MCNC: 4.1 MMOL/L — SIGNIFICANT CHANGE UP (ref 3.5–5.3)
POTASSIUM SERPL-MCNC: 4.1 MMOL/L — SIGNIFICANT CHANGE UP (ref 3.5–5.3)
POTASSIUM SERPL-MCNC: 4.2 MMOL/L — SIGNIFICANT CHANGE UP (ref 3.5–5.3)
POTASSIUM SERPL-MCNC: 4.8 MMOL/L — SIGNIFICANT CHANGE UP (ref 3.5–5.3)
POTASSIUM SERPL-MCNC: 5.1 MMOL/L — SIGNIFICANT CHANGE UP (ref 3.5–5.3)
POTASSIUM SERPL-MCNC: 5.2 MMOL/L — SIGNIFICANT CHANGE UP (ref 3.5–5.3)
POTASSIUM SERPL-SCNC: 2.8 MMOL/L — CRITICAL LOW (ref 3.5–5.3)
POTASSIUM SERPL-SCNC: 3.1 MMOL/L — LOW (ref 3.5–5.3)
POTASSIUM SERPL-SCNC: 3.5 MMOL/L — SIGNIFICANT CHANGE UP (ref 3.5–5.3)
POTASSIUM SERPL-SCNC: 3.8 MMOL/L — SIGNIFICANT CHANGE UP (ref 3.5–5.3)
POTASSIUM SERPL-SCNC: 4 MMOL/L — SIGNIFICANT CHANGE UP (ref 3.5–5.3)
POTASSIUM SERPL-SCNC: 4.1 MMOL/L
POTASSIUM SERPL-SCNC: 4.1 MMOL/L — SIGNIFICANT CHANGE UP (ref 3.5–5.3)
POTASSIUM SERPL-SCNC: 4.1 MMOL/L — SIGNIFICANT CHANGE UP (ref 3.5–5.3)
POTASSIUM SERPL-SCNC: 4.2 MMOL/L — SIGNIFICANT CHANGE UP (ref 3.5–5.3)
POTASSIUM SERPL-SCNC: 4.8 MMOL/L — SIGNIFICANT CHANGE UP (ref 3.5–5.3)
POTASSIUM SERPL-SCNC: 5.1 MMOL/L — SIGNIFICANT CHANGE UP (ref 3.5–5.3)
POTASSIUM SERPL-SCNC: 5.2 MMOL/L — SIGNIFICANT CHANGE UP (ref 3.5–5.3)
PROT SERPL-MCNC: 6 G/DL — SIGNIFICANT CHANGE UP (ref 6–8.3)
PROT SERPL-MCNC: 6.1 G/DL — SIGNIFICANT CHANGE UP (ref 6–8.3)
PROT SERPL-MCNC: 6.2 G/DL — SIGNIFICANT CHANGE UP (ref 6–8.3)
PROT SERPL-MCNC: 6.3 G/DL — SIGNIFICANT CHANGE UP (ref 6–8.3)
PROT SERPL-MCNC: 6.7 G/DL
PROT SERPL-MCNC: 6.7 G/DL — SIGNIFICANT CHANGE UP (ref 6–8.3)
PROT SERPL-MCNC: 6.7 G/DL — SIGNIFICANT CHANGE UP (ref 6–8.3)
PROT SERPL-MCNC: 6.9 G/DL — SIGNIFICANT CHANGE UP (ref 6–8.3)
PROT SERPL-MCNC: 7.1 G/DL — SIGNIFICANT CHANGE UP (ref 6–8.3)
PROTHROM AB SERPL-ACNC: 13.4 SEC — SIGNIFICANT CHANGE UP (ref 10.6–13.6)
PROTHROM AB SERPL-ACNC: 13.6 SEC — SIGNIFICANT CHANGE UP (ref 10.6–13.6)
PROTHROM AB SERPL-ACNC: 14.8 SEC — HIGH (ref 10.5–13.4)
PROTHROM AB SERPL-ACNC: 15.3 SEC — HIGH (ref 10.5–13.4)
PROTHROM AB SERPL-ACNC: 15.5 SEC — HIGH (ref 10.5–13.4)
PT BLD: 13.9 SEC
RBC # BLD: 3.25 M/UL — LOW (ref 3.8–5.2)
RBC # BLD: 3.72 M/UL — LOW (ref 3.8–5.2)
RBC # BLD: 3.72 M/UL — LOW (ref 3.8–5.2)
RBC # BLD: 3.76 M/UL — LOW (ref 3.8–5.2)
RBC # BLD: 3.81 M/UL — SIGNIFICANT CHANGE UP (ref 3.8–5.2)
RBC # BLD: 3.82 M/UL — SIGNIFICANT CHANGE UP (ref 3.8–5.2)
RBC # BLD: 3.88 M/UL — SIGNIFICANT CHANGE UP (ref 3.8–5.2)
RBC # BLD: 3.91 M/UL — SIGNIFICANT CHANGE UP (ref 3.8–5.2)
RBC # BLD: 3.97 M/UL — SIGNIFICANT CHANGE UP (ref 3.8–5.2)
RBC # BLD: 3.99 M/UL — SIGNIFICANT CHANGE UP (ref 3.8–5.2)
RBC # BLD: 3.99 M/UL — SIGNIFICANT CHANGE UP (ref 3.8–5.2)
RBC # BLD: 4.14 M/UL — SIGNIFICANT CHANGE UP (ref 3.8–5.2)
RBC # BLD: 4.16 M/UL — SIGNIFICANT CHANGE UP (ref 3.8–5.2)
RBC # FLD: 13.9 % — SIGNIFICANT CHANGE UP (ref 10.3–14.5)
RBC # FLD: 14.8 % — HIGH (ref 10.3–14.5)
RBC # FLD: 15.5 % — HIGH (ref 10.3–14.5)
RBC # FLD: 15.6 % — HIGH (ref 10.3–14.5)
RBC # FLD: 15.8 % — HIGH (ref 10.3–14.5)
RBC # FLD: 15.9 % — HIGH (ref 10.3–14.5)
RBC # FLD: 15.9 % — HIGH (ref 10.3–14.5)
RBC # FLD: 16 % — HIGH (ref 10.3–14.5)
RBC # FLD: 16.1 % — HIGH (ref 10.3–14.5)
RBC # FLD: 16.2 % — HIGH (ref 10.3–14.5)
RBC # FLD: 16.5 % — HIGH (ref 10.3–14.5)
RBC BLD AUTO: SIGNIFICANT CHANGE UP
RH IG SCN BLD-IMP: NEGATIVE — SIGNIFICANT CHANGE UP
SARS-COV-2 RNA SPEC QL NAA+PROBE: SIGNIFICANT CHANGE UP
SODIUM SERPL-SCNC: 133 MMOL/L — LOW (ref 135–145)
SODIUM SERPL-SCNC: 134 MMOL/L — LOW (ref 135–145)
SODIUM SERPL-SCNC: 135 MMOL/L — SIGNIFICANT CHANGE UP (ref 135–145)
SODIUM SERPL-SCNC: 135 MMOL/L — SIGNIFICANT CHANGE UP (ref 135–145)
SODIUM SERPL-SCNC: 136 MMOL/L — SIGNIFICANT CHANGE UP (ref 135–145)
SODIUM SERPL-SCNC: 136 MMOL/L — SIGNIFICANT CHANGE UP (ref 135–145)
SODIUM SERPL-SCNC: 137 MMOL/L — SIGNIFICANT CHANGE UP (ref 135–145)
SODIUM SERPL-SCNC: 138 MMOL/L — SIGNIFICANT CHANGE UP (ref 135–145)
SODIUM SERPL-SCNC: 139 MMOL/L
SODIUM SERPL-SCNC: 140 MMOL/L — SIGNIFICANT CHANGE UP (ref 135–145)
SURGICAL PATHOLOGY STUDY: SIGNIFICANT CHANGE UP
T4 FREE SERPL-MCNC: 1.4 NG/DL — SIGNIFICANT CHANGE UP (ref 0.9–1.8)
T4 FREE SERPL-MCNC: 1.4 NG/DL — SIGNIFICANT CHANGE UP (ref 0.9–1.8)
T4 FREE+ TSH PNL SERPL: 3.73 UIU/ML — SIGNIFICANT CHANGE UP (ref 0.27–4.2)
T4 FREE+ TSH PNL SERPL: 4.53 UIU/ML — HIGH (ref 0.27–4.2)
T4 FREE+ TSH PNL SERPL: 4.63 UIU/ML — HIGH (ref 0.27–4.2)
TSH SERPL-ACNC: 3.6 UIU/ML
WBC # BLD: 10.08 K/UL — SIGNIFICANT CHANGE UP (ref 3.8–10.5)
WBC # BLD: 5.07 K/UL — SIGNIFICANT CHANGE UP (ref 3.8–10.5)
WBC # BLD: 7.1 K/UL — SIGNIFICANT CHANGE UP (ref 3.8–10.5)
WBC # BLD: 7.14 K/UL — SIGNIFICANT CHANGE UP (ref 3.8–10.5)
WBC # BLD: 7.2 K/UL — SIGNIFICANT CHANGE UP (ref 3.8–10.5)
WBC # BLD: 7.25 K/UL — SIGNIFICANT CHANGE UP (ref 3.8–10.5)
WBC # BLD: 7.34 K/UL — SIGNIFICANT CHANGE UP (ref 3.8–10.5)
WBC # BLD: 7.41 K/UL — SIGNIFICANT CHANGE UP (ref 3.8–10.5)
WBC # BLD: 7.59 K/UL — SIGNIFICANT CHANGE UP (ref 3.8–10.5)
WBC # BLD: 7.69 K/UL — SIGNIFICANT CHANGE UP (ref 3.8–10.5)
WBC # BLD: 7.95 K/UL — SIGNIFICANT CHANGE UP (ref 3.8–10.5)
WBC # BLD: 8.6 K/UL — SIGNIFICANT CHANGE UP (ref 3.8–10.5)
WBC # BLD: 9.14 K/UL — SIGNIFICANT CHANGE UP (ref 3.8–10.5)
WBC # FLD AUTO: 10.08 K/UL — SIGNIFICANT CHANGE UP (ref 3.8–10.5)
WBC # FLD AUTO: 5.07 K/UL — SIGNIFICANT CHANGE UP (ref 3.8–10.5)
WBC # FLD AUTO: 7.1 K/UL — SIGNIFICANT CHANGE UP (ref 3.8–10.5)
WBC # FLD AUTO: 7.14 K/UL — SIGNIFICANT CHANGE UP (ref 3.8–10.5)
WBC # FLD AUTO: 7.2 K/UL — SIGNIFICANT CHANGE UP (ref 3.8–10.5)
WBC # FLD AUTO: 7.25 K/UL — SIGNIFICANT CHANGE UP (ref 3.8–10.5)
WBC # FLD AUTO: 7.34 K/UL — SIGNIFICANT CHANGE UP (ref 3.8–10.5)
WBC # FLD AUTO: 7.41 K/UL — SIGNIFICANT CHANGE UP (ref 3.8–10.5)
WBC # FLD AUTO: 7.59 K/UL — SIGNIFICANT CHANGE UP (ref 3.8–10.5)
WBC # FLD AUTO: 7.69 K/UL — SIGNIFICANT CHANGE UP (ref 3.8–10.5)
WBC # FLD AUTO: 7.95 K/UL — SIGNIFICANT CHANGE UP (ref 3.8–10.5)
WBC # FLD AUTO: 8.6 K/UL — SIGNIFICANT CHANGE UP (ref 3.8–10.5)
WBC # FLD AUTO: 9.14 K/UL — SIGNIFICANT CHANGE UP (ref 3.8–10.5)

## 2022-01-01 PROCEDURE — 36561 INSERT TUNNELED CV CATH: CPT

## 2022-01-01 PROCEDURE — 76942 ECHO GUIDE FOR BIOPSY: CPT

## 2022-01-01 PROCEDURE — 77001 FLUOROGUIDE FOR VEIN DEVICE: CPT

## 2022-01-01 PROCEDURE — 80053 COMPREHEN METABOLIC PANEL: CPT

## 2022-01-01 PROCEDURE — U0003: CPT

## 2022-01-01 PROCEDURE — 76705 ECHO EXAM OF ABDOMEN: CPT | Mod: 26

## 2022-01-01 PROCEDURE — 76937 US GUIDE VASCULAR ACCESS: CPT | Mod: 26

## 2022-01-01 PROCEDURE — 80048 BASIC METABOLIC PNL TOTAL CA: CPT

## 2022-01-01 PROCEDURE — 85610 PROTHROMBIN TIME: CPT

## 2022-01-01 PROCEDURE — 78830 RP LOCLZJ TUM SPECT W/CT 1: CPT | Mod: 26

## 2022-01-01 PROCEDURE — 71250 CT THORAX DX C-: CPT | Mod: 26,ME

## 2022-01-01 PROCEDURE — 85027 COMPLETE CBC AUTOMATED: CPT

## 2022-01-01 PROCEDURE — 78830 RP LOCLZJ TUM SPECT W/CT 1: CPT

## 2022-01-01 PROCEDURE — 99214 OFFICE O/P EST MOD 30 MIN: CPT

## 2022-01-01 PROCEDURE — 99443: CPT | Mod: 95

## 2022-01-01 PROCEDURE — 77001 FLUOROGUIDE FOR VEIN DEVICE: CPT | Mod: 26

## 2022-01-01 PROCEDURE — C1894: CPT

## 2022-01-01 PROCEDURE — C1887: CPT

## 2022-01-01 PROCEDURE — A9585: CPT

## 2022-01-01 PROCEDURE — 88312 SPECIAL STAINS GROUP 1: CPT | Mod: 26

## 2022-01-01 PROCEDURE — 97161 PT EVAL LOW COMPLEX 20 MIN: CPT

## 2022-01-01 PROCEDURE — U0005: CPT

## 2022-01-01 PROCEDURE — 88305 TISSUE EXAM BY PATHOLOGIST: CPT | Mod: 26

## 2022-01-01 PROCEDURE — 86900 BLOOD TYPING SEROLOGIC ABO: CPT

## 2022-01-01 PROCEDURE — 76937 US GUIDE VASCULAR ACCESS: CPT

## 2022-01-01 PROCEDURE — 36247 INS CATH ABD/L-EXT ART 3RD: CPT

## 2022-01-01 PROCEDURE — 93010 ELECTROCARDIOGRAM REPORT: CPT

## 2022-01-01 PROCEDURE — G1004: CPT

## 2022-01-01 PROCEDURE — 99285 EMERGENCY DEPT VISIT HI MDM: CPT | Mod: 25

## 2022-01-01 PROCEDURE — 75774 ARTERY X-RAY EACH VESSEL: CPT

## 2022-01-01 PROCEDURE — A9540: CPT

## 2022-01-01 PROCEDURE — 77336 RADIATION PHYSICS CONSULT: CPT

## 2022-01-01 PROCEDURE — 88307 TISSUE EXAM BY PATHOLOGIST: CPT | Mod: 26

## 2022-01-01 PROCEDURE — 86850 RBC ANTIBODY SCREEN: CPT

## 2022-01-01 PROCEDURE — 74183 MRI ABD W/O CNTR FLWD CNTR: CPT | Mod: ME

## 2022-01-01 PROCEDURE — C1769: CPT

## 2022-01-01 PROCEDURE — 93970 EXTREMITY STUDY: CPT

## 2022-01-01 PROCEDURE — 77300 RADIATION THERAPY DOSE PLAN: CPT

## 2022-01-01 PROCEDURE — 79445 NUCLEAR RX INTRA-ARTERIAL: CPT | Mod: 26

## 2022-01-01 PROCEDURE — 85025 COMPLETE CBC W/AUTO DIFF WBC: CPT

## 2022-01-01 PROCEDURE — 75726 ARTERY X-RAYS ABDOMEN: CPT | Mod: 26

## 2022-01-01 PROCEDURE — 78830 RP LOCLZJ TUM SPECT W/CT 1: CPT | Mod: MG

## 2022-01-01 PROCEDURE — 47000 NEEDLE BIOPSY OF LIVER PERQ: CPT

## 2022-01-01 PROCEDURE — 75726 ARTERY X-RAYS ABDOMEN: CPT

## 2022-01-01 PROCEDURE — 78306 BONE IMAGING WHOLE BODY: CPT | Mod: 26,ME

## 2022-01-01 PROCEDURE — 88172 CYTP DX EVAL FNA 1ST EA SITE: CPT

## 2022-01-01 PROCEDURE — 37243 VASC EMBOLIZE/OCCLUDE ORGAN: CPT

## 2022-01-01 PROCEDURE — 85730 THROMBOPLASTIN TIME PARTIAL: CPT

## 2022-01-01 PROCEDURE — 97530 THERAPEUTIC ACTIVITIES: CPT

## 2022-01-01 PROCEDURE — 79445 NUCLEAR RX INTRA-ARTERIAL: CPT

## 2022-01-01 PROCEDURE — A9561: CPT

## 2022-01-01 PROCEDURE — 43239 EGD BIOPSY SINGLE/MULTIPLE: CPT

## 2022-01-01 PROCEDURE — G2012 BRIEF CHECK IN BY MD/QHP: CPT

## 2022-01-01 PROCEDURE — 84145 PROCALCITONIN (PCT): CPT

## 2022-01-01 PROCEDURE — 71250 CT THORAX DX C-: CPT | Mod: ME

## 2022-01-01 PROCEDURE — 76380 CAT SCAN FOLLOW-UP STUDY: CPT

## 2022-01-01 PROCEDURE — 88305 TISSUE EXAM BY PATHOLOGIST: CPT

## 2022-01-01 PROCEDURE — 86803 HEPATITIS C AB TEST: CPT

## 2022-01-01 PROCEDURE — 86901 BLOOD TYPING SEROLOGIC RH(D): CPT

## 2022-01-01 PROCEDURE — 45380 COLONOSCOPY AND BIOPSY: CPT | Mod: PT

## 2022-01-01 PROCEDURE — 97116 GAIT TRAINING THERAPY: CPT

## 2022-01-01 PROCEDURE — 36248 INS CATH ABD/L-EXT ART ADDL: CPT

## 2022-01-01 PROCEDURE — C9803: CPT

## 2022-01-01 PROCEDURE — XXXXX: CPT | Mod: 1L

## 2022-01-01 PROCEDURE — G0463: CPT

## 2022-01-01 PROCEDURE — 99285 EMERGENCY DEPT VISIT HI MDM: CPT

## 2022-01-01 PROCEDURE — 36415 COLL VENOUS BLD VENIPUNCTURE: CPT

## 2022-01-01 PROCEDURE — 93306 TTE W/DOPPLER COMPLETE: CPT | Mod: 26

## 2022-01-01 PROCEDURE — 88307 TISSUE EXAM BY PATHOLOGIST: CPT

## 2022-01-01 PROCEDURE — 76942 ECHO GUIDE FOR BIOPSY: CPT | Mod: 26,59

## 2022-01-01 PROCEDURE — 83735 ASSAY OF MAGNESIUM: CPT

## 2022-01-01 PROCEDURE — 86922 COMPATIBILITY TEST ANTIGLOB: CPT

## 2022-01-01 PROCEDURE — 45380 COLONOSCOPY AND BIOPSY: CPT

## 2022-01-01 PROCEDURE — 93970 EXTREMITY STUDY: CPT | Mod: 26

## 2022-01-01 PROCEDURE — 78830 RP LOCLZJ TUM SPECT W/CT 1: CPT | Mod: 26,MG

## 2022-01-01 PROCEDURE — 74183 MRI ABD W/O CNTR FLWD CNTR: CPT | Mod: 26,ME

## 2022-01-01 PROCEDURE — 71045 X-RAY EXAM CHEST 1 VIEW: CPT

## 2022-01-01 PROCEDURE — 75774 ARTERY X-RAY EACH VESSEL: CPT | Mod: 26,XS

## 2022-01-01 PROCEDURE — C1889: CPT

## 2022-01-01 PROCEDURE — 76380 CAT SCAN FOLLOW-UP STUDY: CPT | Mod: 26,MH

## 2022-01-01 PROCEDURE — 83880 ASSAY OF NATRIURETIC PEPTIDE: CPT

## 2022-01-01 PROCEDURE — 71045 X-RAY EXAM CHEST 1 VIEW: CPT | Mod: 26

## 2022-01-01 PROCEDURE — 97110 THERAPEUTIC EXERCISES: CPT

## 2022-01-01 PROCEDURE — 76705 ECHO EXAM OF ABDOMEN: CPT

## 2022-01-01 PROCEDURE — C1788: CPT

## 2022-01-01 PROCEDURE — 93306 TTE W/DOPPLER COMPLETE: CPT

## 2022-01-01 PROCEDURE — 82962 GLUCOSE BLOOD TEST: CPT

## 2022-01-01 PROCEDURE — 88312 SPECIAL STAINS GROUP 1: CPT

## 2022-01-01 PROCEDURE — C2616: CPT

## 2022-01-01 PROCEDURE — 78306 BONE IMAGING WHOLE BODY: CPT | Mod: ME

## 2022-01-01 DEVICE — NET RETRV ROT ROTH 2.5MMX230CM: Type: IMPLANTABLE DEVICE | Status: FUNCTIONAL

## 2022-01-01 RX ORDER — FOLIC ACID 0.8 MG
1 TABLET ORAL DAILY
Refills: 0 | Status: DISCONTINUED | OUTPATIENT
Start: 2022-01-01 | End: 2022-01-01

## 2022-01-01 RX ORDER — PANTOPRAZOLE SODIUM 20 MG/1
40 TABLET, DELAYED RELEASE ORAL
Refills: 0 | Status: DISCONTINUED | OUTPATIENT
Start: 2022-01-01 | End: 2022-01-01

## 2022-01-01 RX ORDER — ACETAMINOPHEN 500 MG
1000 TABLET ORAL ONCE
Refills: 0 | Status: COMPLETED | OUTPATIENT
Start: 2022-01-01 | End: 2022-01-01

## 2022-01-01 RX ORDER — ONDANSETRON 8 MG/1
8 TABLET ORAL EVERY 8 HOURS
Qty: 30 | Refills: 2 | Status: ACTIVE | COMMUNITY
Start: 2022-01-01 | End: 1900-01-01

## 2022-01-01 RX ORDER — LEVOCETIRIZINE DIHYDROCHLORIDE 0.5 MG/ML
1 SOLUTION ORAL
Qty: 0 | Refills: 0 | DISCHARGE

## 2022-01-01 RX ORDER — POTASSIUM CHLORIDE 20 MEQ
10 PACKET (EA) ORAL DAILY
Refills: 0 | Status: DISCONTINUED | OUTPATIENT
Start: 2022-01-01 | End: 2022-01-01

## 2022-01-01 RX ORDER — POTASSIUM CHLORIDE 750 MG/1
10 TABLET, FILM COATED, EXTENDED RELEASE ORAL DAILY
Refills: 0 | Status: ACTIVE | COMMUNITY
Start: 2022-01-01

## 2022-01-01 RX ORDER — ONDANSETRON 8 MG/1
1 TABLET, FILM COATED ORAL
Qty: 0 | Refills: 0 | DISCHARGE

## 2022-01-01 RX ORDER — DONEPEZIL HYDROCHLORIDE 5 MG/1
5 TABLET ORAL
Qty: 30 | Refills: 0 | Status: ACTIVE | COMMUNITY
Start: 2022-01-01

## 2022-01-01 RX ORDER — BUPROPION HYDROCHLORIDE 150 MG/1
1 TABLET, EXTENDED RELEASE ORAL
Qty: 0 | Refills: 0 | DISCHARGE

## 2022-01-01 RX ORDER — TRAZODONE HCL 50 MG
1 TABLET ORAL
Qty: 0 | Refills: 0 | DISCHARGE

## 2022-01-01 RX ORDER — FUROSEMIDE 40 MG
1 TABLET ORAL
Qty: 0 | Refills: 0 | DISCHARGE

## 2022-01-01 RX ORDER — DOCUSATE SODIUM 100 MG
1 CAPSULE ORAL
Qty: 0 | Refills: 0 | DISCHARGE

## 2022-01-01 RX ORDER — POTASSIUM CHLORIDE 20 MEQ
40 PACKET (EA) ORAL ONCE
Refills: 0 | Status: COMPLETED | OUTPATIENT
Start: 2022-01-01 | End: 2022-01-01

## 2022-01-01 RX ORDER — HEPARIN SODIUM 5000 [USP'U]/ML
5000 INJECTION INTRAVENOUS; SUBCUTANEOUS EVERY 12 HOURS
Refills: 0 | Status: DISCONTINUED | OUTPATIENT
Start: 2022-01-01 | End: 2022-01-01

## 2022-01-01 RX ORDER — BENZONATATE 200 MG/1
200 CAPSULE ORAL
Qty: 60 | Refills: 0 | Status: COMPLETED | COMMUNITY
Start: 2022-01-01

## 2022-01-01 RX ORDER — TRAZODONE HCL 50 MG
100 TABLET ORAL AT BEDTIME
Refills: 0 | Status: DISCONTINUED | OUTPATIENT
Start: 2022-01-01 | End: 2022-01-01

## 2022-01-01 RX ORDER — SODIUM CHLORIDE 9 MG/ML
500 INJECTION INTRAMUSCULAR; INTRAVENOUS; SUBCUTANEOUS
Refills: 0 | Status: DISCONTINUED | OUTPATIENT
Start: 2022-01-01 | End: 2022-01-01

## 2022-01-01 RX ORDER — ONDANSETRON 8 MG/1
4 TABLET, FILM COATED ORAL ONCE
Refills: 0 | Status: COMPLETED | OUTPATIENT
Start: 2022-01-01 | End: 2022-01-01

## 2022-01-01 RX ORDER — PANTOPRAZOLE 40 MG/1
40 TABLET, DELAYED RELEASE ORAL DAILY
Qty: 30 | Refills: 0 | Status: DISCONTINUED | COMMUNITY
Start: 2021-01-01 | End: 2022-01-01

## 2022-01-01 RX ORDER — FOLIC ACID 0.8 MG
1 TABLET ORAL
Qty: 0 | Refills: 0 | DISCHARGE
Start: 2022-01-01

## 2022-01-01 RX ORDER — FUROSEMIDE 40 MG
40 TABLET ORAL
Refills: 0 | Status: DISCONTINUED | OUTPATIENT
Start: 2022-01-01 | End: 2022-01-01

## 2022-01-01 RX ORDER — BUPROPION HYDROCHLORIDE 150 MG/1
0 TABLET, EXTENDED RELEASE ORAL
Qty: 0 | Refills: 0 | DISCHARGE

## 2022-01-01 RX ORDER — POTASSIUM CHLORIDE 20 MEQ
10 PACKET (EA) ORAL
Refills: 0 | Status: COMPLETED | OUTPATIENT
Start: 2022-01-01 | End: 2022-01-01

## 2022-01-01 RX ORDER — ERGOCALCIFEROL 1.25 MG/1
1 CAPSULE ORAL
Qty: 0 | Refills: 0 | DISCHARGE

## 2022-01-01 RX ORDER — PANTOPRAZOLE SODIUM 20 MG/1
1 TABLET, DELAYED RELEASE ORAL
Qty: 0 | Refills: 0 | DISCHARGE

## 2022-01-01 RX ORDER — PANTOPRAZOLE 40 MG/1
40 TABLET, DELAYED RELEASE ORAL DAILY
Qty: 30 | Refills: 5 | Status: ACTIVE | COMMUNITY
Start: 2022-01-01

## 2022-01-01 RX ORDER — HEPARIN SODIUM 5000 [USP'U]/ML
5000 INJECTION INTRAVENOUS; SUBCUTANEOUS
Qty: 0 | Refills: 0 | DISCHARGE
Start: 2022-01-01

## 2022-01-01 RX ORDER — FUROSEMIDE 40 MG
40 TABLET ORAL DAILY
Refills: 0 | Status: DISCONTINUED | OUTPATIENT
Start: 2022-01-01 | End: 2022-01-01

## 2022-01-01 RX ORDER — FOLIC ACID 0.8 MG
1 TABLET ORAL
Qty: 0 | Refills: 0 | DISCHARGE

## 2022-01-01 RX ORDER — PANTOPRAZOLE SODIUM 20 MG/1
1 TABLET, DELAYED RELEASE ORAL
Qty: 0 | Refills: 0 | DISCHARGE
Start: 2022-01-01

## 2022-01-01 RX ORDER — POTASSIUM CHLORIDE 20 MEQ
1 PACKET (EA) ORAL
Qty: 0 | Refills: 0 | DISCHARGE
Start: 2022-01-01

## 2022-01-01 RX ORDER — DOCUSATE SODIUM 100 MG/1
100 CAPSULE ORAL DAILY
Refills: 0 | Status: ACTIVE | COMMUNITY
Start: 2022-01-01

## 2022-01-01 RX ORDER — FUROSEMIDE 40 MG/1
40 TABLET ORAL DAILY
Qty: 30 | Refills: 1 | Status: ACTIVE | COMMUNITY
Start: 2022-01-01

## 2022-01-01 RX ORDER — BUPROPION HYDROCHLORIDE 150 MG/1
150 TABLET, EXTENDED RELEASE ORAL DAILY
Refills: 0 | Status: DISCONTINUED | OUTPATIENT
Start: 2022-01-01 | End: 2022-01-01

## 2022-01-01 RX ORDER — FUROSEMIDE 40 MG
1 TABLET ORAL
Qty: 0 | Refills: 0 | DISCHARGE
Start: 2022-01-01

## 2022-01-01 RX ADMIN — HEPARIN SODIUM 5000 UNIT(S): 5000 INJECTION INTRAVENOUS; SUBCUTANEOUS at 06:29

## 2022-01-01 RX ADMIN — Medication 40 MILLIGRAM(S): at 05:14

## 2022-01-01 RX ADMIN — Medication 400 MILLIGRAM(S): at 11:12

## 2022-01-01 RX ADMIN — PANTOPRAZOLE SODIUM 40 MILLIGRAM(S): 20 TABLET, DELAYED RELEASE ORAL at 05:52

## 2022-01-01 RX ADMIN — BUPROPION HYDROCHLORIDE 150 MILLIGRAM(S): 150 TABLET, EXTENDED RELEASE ORAL at 21:44

## 2022-01-01 RX ADMIN — HEPARIN SODIUM 5000 UNIT(S): 5000 INJECTION INTRAVENOUS; SUBCUTANEOUS at 17:29

## 2022-01-01 RX ADMIN — Medication 100 MILLIGRAM(S): at 21:27

## 2022-01-01 RX ADMIN — HEPARIN SODIUM 5000 UNIT(S): 5000 INJECTION INTRAVENOUS; SUBCUTANEOUS at 17:58

## 2022-01-01 RX ADMIN — HEPARIN SODIUM 5000 UNIT(S): 5000 INJECTION INTRAVENOUS; SUBCUTANEOUS at 05:37

## 2022-01-01 RX ADMIN — HEPARIN SODIUM 5000 UNIT(S): 5000 INJECTION INTRAVENOUS; SUBCUTANEOUS at 17:40

## 2022-01-01 RX ADMIN — HEPARIN SODIUM 5000 UNIT(S): 5000 INJECTION INTRAVENOUS; SUBCUTANEOUS at 17:17

## 2022-01-01 RX ADMIN — Medication 40 MILLIGRAM(S): at 12:49

## 2022-01-01 RX ADMIN — PANTOPRAZOLE SODIUM 40 MILLIGRAM(S): 20 TABLET, DELAYED RELEASE ORAL at 05:31

## 2022-01-01 RX ADMIN — Medication 100 MILLIGRAM(S): at 21:44

## 2022-01-01 RX ADMIN — Medication 40 MILLIGRAM(S): at 05:30

## 2022-01-01 RX ADMIN — Medication 1 MILLIGRAM(S): at 11:32

## 2022-01-01 RX ADMIN — BUPROPION HYDROCHLORIDE 150 MILLIGRAM(S): 150 TABLET, EXTENDED RELEASE ORAL at 22:17

## 2022-01-01 RX ADMIN — Medication 100 MILLIGRAM(S): at 22:07

## 2022-01-01 RX ADMIN — Medication 40 MILLIGRAM(S): at 06:14

## 2022-01-01 RX ADMIN — Medication 100 MILLIEQUIVALENT(S): at 11:08

## 2022-01-01 RX ADMIN — Medication 40 MILLIGRAM(S): at 13:38

## 2022-01-01 RX ADMIN — Medication 1 MILLIGRAM(S): at 13:27

## 2022-01-01 RX ADMIN — Medication 40 MILLIEQUIVALENT(S): at 13:40

## 2022-01-01 RX ADMIN — Medication 40 MILLIGRAM(S): at 13:09

## 2022-01-01 RX ADMIN — PANTOPRAZOLE SODIUM 40 MILLIGRAM(S): 20 TABLET, DELAYED RELEASE ORAL at 06:29

## 2022-01-01 RX ADMIN — HEPARIN SODIUM 5000 UNIT(S): 5000 INJECTION INTRAVENOUS; SUBCUTANEOUS at 18:24

## 2022-01-01 RX ADMIN — BUPROPION HYDROCHLORIDE 150 MILLIGRAM(S): 150 TABLET, EXTENDED RELEASE ORAL at 22:07

## 2022-01-01 RX ADMIN — Medication 100 MILLIGRAM(S): at 23:20

## 2022-01-01 RX ADMIN — Medication 40 MILLIGRAM(S): at 06:30

## 2022-01-01 RX ADMIN — BUPROPION HYDROCHLORIDE 150 MILLIGRAM(S): 150 TABLET, EXTENDED RELEASE ORAL at 21:03

## 2022-01-01 RX ADMIN — HEPARIN SODIUM 5000 UNIT(S): 5000 INJECTION INTRAVENOUS; SUBCUTANEOUS at 05:59

## 2022-01-01 RX ADMIN — BUPROPION HYDROCHLORIDE 150 MILLIGRAM(S): 150 TABLET, EXTENDED RELEASE ORAL at 21:24

## 2022-01-01 RX ADMIN — Medication 10 MILLIEQUIVALENT(S): at 13:27

## 2022-01-01 RX ADMIN — HEPARIN SODIUM 5000 UNIT(S): 5000 INJECTION INTRAVENOUS; SUBCUTANEOUS at 05:30

## 2022-01-01 RX ADMIN — BUPROPION HYDROCHLORIDE 150 MILLIGRAM(S): 150 TABLET, EXTENDED RELEASE ORAL at 21:27

## 2022-01-01 RX ADMIN — Medication 40 MILLIGRAM(S): at 05:37

## 2022-01-01 RX ADMIN — BUPROPION HYDROCHLORIDE 150 MILLIGRAM(S): 150 TABLET, EXTENDED RELEASE ORAL at 23:20

## 2022-01-01 RX ADMIN — HEPARIN SODIUM 5000 UNIT(S): 5000 INJECTION INTRAVENOUS; SUBCUTANEOUS at 05:51

## 2022-01-01 RX ADMIN — PANTOPRAZOLE SODIUM 40 MILLIGRAM(S): 20 TABLET, DELAYED RELEASE ORAL at 05:30

## 2022-01-01 RX ADMIN — Medication 100 MILLIGRAM(S): at 21:24

## 2022-01-01 RX ADMIN — Medication 100 MILLIGRAM(S): at 22:01

## 2022-01-01 RX ADMIN — HEPARIN SODIUM 5000 UNIT(S): 5000 INJECTION INTRAVENOUS; SUBCUTANEOUS at 17:56

## 2022-01-01 RX ADMIN — Medication 10 MILLIEQUIVALENT(S): at 22:47

## 2022-01-01 RX ADMIN — PANTOPRAZOLE SODIUM 40 MILLIGRAM(S): 20 TABLET, DELAYED RELEASE ORAL at 06:14

## 2022-01-01 RX ADMIN — Medication 40 MILLIGRAM(S): at 05:51

## 2022-01-01 RX ADMIN — Medication 40 MILLIGRAM(S): at 14:34

## 2022-01-01 RX ADMIN — Medication 40 MILLIEQUIVALENT(S): at 11:08

## 2022-01-01 RX ADMIN — Medication 100 MILLIEQUIVALENT(S): at 14:18

## 2022-01-01 RX ADMIN — Medication 1 MILLIGRAM(S): at 12:46

## 2022-01-01 RX ADMIN — Medication 40 MILLIGRAM(S): at 05:52

## 2022-01-01 RX ADMIN — HEPARIN SODIUM 5000 UNIT(S): 5000 INJECTION INTRAVENOUS; SUBCUTANEOUS at 18:00

## 2022-01-01 RX ADMIN — PANTOPRAZOLE SODIUM 40 MILLIGRAM(S): 20 TABLET, DELAYED RELEASE ORAL at 05:15

## 2022-01-01 RX ADMIN — HEPARIN SODIUM 5000 UNIT(S): 5000 INJECTION INTRAVENOUS; SUBCUTANEOUS at 05:14

## 2022-01-01 RX ADMIN — Medication 1 MILLIGRAM(S): at 13:38

## 2022-01-01 RX ADMIN — HEPARIN SODIUM 5000 UNIT(S): 5000 INJECTION INTRAVENOUS; SUBCUTANEOUS at 06:17

## 2022-01-01 RX ADMIN — BUPROPION HYDROCHLORIDE 150 MILLIGRAM(S): 150 TABLET, EXTENDED RELEASE ORAL at 22:01

## 2022-01-01 RX ADMIN — ONDANSETRON 4 MILLIGRAM(S): 8 TABLET, FILM COATED ORAL at 01:10

## 2022-01-01 RX ADMIN — BUPROPION HYDROCHLORIDE 150 MILLIGRAM(S): 150 TABLET, EXTENDED RELEASE ORAL at 21:13

## 2022-01-01 RX ADMIN — PANTOPRAZOLE SODIUM 40 MILLIGRAM(S): 20 TABLET, DELAYED RELEASE ORAL at 05:37

## 2022-01-01 RX ADMIN — Medication 100 MILLIGRAM(S): at 22:16

## 2022-01-01 RX ADMIN — Medication 40 MILLIGRAM(S): at 23:20

## 2022-01-01 RX ADMIN — Medication 1 MILLIGRAM(S): at 12:35

## 2022-01-01 RX ADMIN — Medication 100 MILLIGRAM(S): at 21:13

## 2022-01-01 RX ADMIN — Medication 1 MILLIGRAM(S): at 12:19

## 2022-01-01 RX ADMIN — Medication 1 MILLIGRAM(S): at 11:08

## 2022-01-01 RX ADMIN — Medication 100 MILLIEQUIVALENT(S): at 16:01

## 2022-01-01 RX ADMIN — Medication 10 MILLIEQUIVALENT(S): at 12:19

## 2022-01-01 RX ADMIN — Medication 1 MILLIGRAM(S): at 12:01

## 2022-01-01 RX ADMIN — PANTOPRAZOLE SODIUM 40 MILLIGRAM(S): 20 TABLET, DELAYED RELEASE ORAL at 05:59

## 2022-01-01 RX ADMIN — Medication 100 MILLIGRAM(S): at 21:03

## 2022-01-01 RX ADMIN — HEPARIN SODIUM 5000 UNIT(S): 5000 INJECTION INTRAVENOUS; SUBCUTANEOUS at 05:52

## 2022-01-01 RX ADMIN — HEPARIN SODIUM 5000 UNIT(S): 5000 INJECTION INTRAVENOUS; SUBCUTANEOUS at 17:38

## 2022-01-01 RX ADMIN — Medication 1 MILLIGRAM(S): at 12:38

## 2022-01-02 PROBLEM — Z87.39 HISTORY OF OSTEOARTHRITIS: Status: RESOLVED | Noted: 2022-01-01 | Resolved: 2022-01-01

## 2022-01-02 PROBLEM — I10 BENIGN ESSENTIAL HTN: Status: RESOLVED | Noted: 2022-01-01 | Resolved: 2022-01-01

## 2022-01-02 PROBLEM — M25.569 KNEE PAIN: Status: RESOLVED | Noted: 2022-01-01 | Resolved: 2022-01-01

## 2022-01-02 PROBLEM — F41.9 ANXIETY AND DEPRESSION: Status: RESOLVED | Noted: 2022-01-01 | Resolved: 2022-01-01

## 2022-01-02 PROBLEM — Z87.19 HISTORY OF CIRRHOSIS: Status: RESOLVED | Noted: 2022-01-01 | Resolved: 2022-01-01

## 2022-01-02 NOTE — HISTORY OF PRESENT ILLNESS
[Disease: _____________________] : Disease: [unfilled] [AJCC Stage: ____] : AJCC Stage: [unfilled] [de-identified] : 70 y/o F w/ hx of obesity, WILEY/alcohol associated cirrhosis, s/p L knee replacement with resulting physical debility referred for further evaluation of advanced HCC. \par \par Maryanne was found to have elevated LFTs which prompted an MRI abd on 12/14/21 which showed cirrhosis with evidence of paraesophageal splenorenal varices, thrombosis of the R portal vein, suspicious enhancing nodule in segment 8. Patchy infiltrative arterial phase enhancement throughout segment 5, 8, 4A, 4B measuring 10.7 x 7.4 cm with suggestion of washout on portal venous and equilibrium phase highly suspicious for infiltrative HCC with restricted diffusion.\par \par Referred to medical oncology for further evaluation.  [de-identified] : n/a [de-identified] : has difficulty walking due to L knee instability. had a L knee replacement 2 yrs ago. Needs help with all ADLs. in a wheelchair today, uses wheelchair at home. 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 denies any pain. Chronic LE edema. \par 3 kids, 5 grandkids - do not live locally.

## 2022-01-02 NOTE — REVIEW OF SYSTEMS
[Fatigue] : fatigue [Lower Ext Edema] : lower extremity edema [Joint Pain] : joint pain [Difficulty Walking] : difficulty walking [Depression] : depression [Negative] : Allergic/Immunologic

## 2022-01-02 NOTE — PHYSICAL EXAM
[Capable of only limited self care, confined to bed or chair more than 50% of waking hours] : Status 3- Capable of only limited self care, confined to bed or chair more than 50% of waking hours [Normal] : affect appropriate [de-identified] : b/l LE edema [de-identified] : limited ROM in lower extremities  [de-identified] : flat affect

## 2022-01-10 NOTE — ASU PATIENT PROFILE, ADULT - FALL HARM RISK - RISK INTERVENTIONS

## 2022-01-10 NOTE — PRE PROCEDURE NOTE - PRE PROCEDURE EVALUATION
Attending Physician: Juan J Coffey                        Procedure: EGD/Colonoscopy    Indication for Procedure: Variceal screening and colon cancer screening  ________________________________________________________  PAST MEDICAL & SURGICAL HISTORY:  No pertinent past medical history    No significant past surgical history      ALLERGIES:  penicillin (Unknown)    HOME MEDICATIONS:  acetaminophen 325 mg oral tablet: 2 tab(s) orally every 6 hours, As needed, Mild Pain  hydroCHLOROthiazide 12.5 mg oral capsule: 1 cap(s) orally once a day  levETIRAcetam 500 mg oral tablet: 1 tab(s) orally 2 times a day x 5 days  oxyCODONE 5 mg oral tablet: 1 tab(s) orally every 4 hours, As needed, Moderate Pain  Wellbutrin 100 mg oral tablet: orally once a day (at bedtime)    AICD/PPM: [ ] yes   [ ] no    PERTINENT LAB DATA:                      PHYSICAL EXAMINATION:    T(C): --  HR: --  BP: --  RR: --  SpO2: --    Constitutional: NAD  HEENT: PERRLA, EOMI,    Neck:  No JVD  Respiratory: CTAB/L  Cardiovascular: S1 and S2  Gastrointestinal: BS+, soft, NT/ND  Extremities: No peripheral edema  Neurological: A/O x 3, no focal deficits  Psychiatric: Normal mood, normal affect  Skin: No rashes    ASA Class: I [ ]  II [ ]  III [ x]  IV [ ]    COMMENTS:    The patient is a suitable candidate for the planned procedure unless box checked [ ]  No, explain:

## 2022-01-10 NOTE — ASU DISCHARGE PLAN (ADULT/PEDIATRIC) - NS MD DC FALL RISK RISK
For information on Fall & Injury Prevention, visit: [email or web address] OR  [email or web address] OR  https://www.cdc.gov/steadi/patient.html

## 2022-01-10 NOTE — PACU DISCHARGE NOTE - PAIN:
[facility])    • Elevated troponin    • Essential (primary) hypertension    • Fever    • History of blood coagulation disorder    • History of edema    • History of hypertension    • Hypokalemia    • Morbid obesity (CMS/[facility])        Scarlet Mack, DO  4/15/2019     Controlled with current regime

## 2022-01-10 NOTE — ASU PREOP CHECKLIST - LOOSE TEETH
Pt awake, alert and sitting up in bed. PT states \"I feel better\". Pt's respirations are regular, clear and unlabored. Pt in no apparent distress. no

## 2022-01-18 NOTE — H&P PST ADULT - HISTORY OF PRESENT ILLNESS
71 y.o. female with h/o HTN, obesity, OA s/p Left TKR with continuous difficulty walking, recent blood work and diagnostic imaging revealed elevated liver enzymes, fatty liver disease, WILEY + ETOH cirrhosis, concern for infiltrative HCC, and portal vein thrombosis who presents for Mapping Angio and NM Scan with Anesthesia on 01/20/22.     Covid test done today.  71 y.o. female with h/o HTN, obesity (BMI 40.5), OA s/p Left TKR without improvement 2 years ago, depression, ETOH abuse in the past (4-5 vodkas a night years ago, recently 1 glass of wine a night, none for one month), recent blood work and diagnostic imaging revealed elevated liver enzymes, fatty liver disease, WILEY + ETOH cirrhosis, concern for infiltrative HCC, and portal vein thrombosis who presents for Mapping Angio and NM Scan with Anesthesia on 01/20/22.     Covid test done today.

## 2022-01-18 NOTE — H&P PST ADULT - CARDIOVASCULAR
[de-identified] : We discussed further treatment options.  He will try some additional physical therapy.  Follow-up in 2 months. details… detailed exam

## 2022-01-18 NOTE — H&P PST ADULT - NSICDXPASTMEDICALHX_GEN_ALL_CORE_FT
PAST MEDICAL HISTORY:  Anxiety and depression     Chronic lower back pain     ETOH abuse     H/O ETOH abuse     HCC (hepatocellular carcinoma) Stage III    History of morbid obesity     HTN (hypertension)     Liver cirrhosis     Osteoarthritis of both knees      PAST MEDICAL HISTORY:  Anxiety and depression     Chronic lower back pain     ETOH abuse     H/O ETOH abuse     HCC (hepatocellular carcinoma) Stage III    History of morbid obesity     History of subdural hematoma s/p fall 12/06/2015 - stable, no intervention    HTN (hypertension)     Liver cirrhosis     Morbid obesity BMI 40.5    Osteoarthritis of both knees     Portal vein thrombosis secondary to HCC invasion     Seasonal allergies

## 2022-01-20 NOTE — ASU PREOP CHECKLIST - NEEDLE GAUGE
22 Breathing spontaneous and unlabored. Breath sounds clear and equal bilaterally with regular rhythm.

## 2022-01-20 NOTE — ASU DISCHARGE PLAN (ADULT/PEDIATRIC) - ASU DC SPECIAL INSTRUCTIONSFT
Post Angiogram & Liver Biopsy Instructions    - You underwent a mapping angiogram /nuclear medicine study and liver biopsy to treat your liver tumor (s) on 1/20 by Dr. Navas    - Monitor right groin site for symptoms of bleeding, hard area underneath the skin, bruising, numbness, intense pain, or inability to move.  If you have any of these symptoms, contact your doctor and seek immediate medical attention    - You may have mild abdominal pain, nausea, loss of appetite, fatigue, and/or low grade fever.  These are normal after your procedure and they should resolve within 3-5 days.  Please call Interventional Radiology if you have a fever > 102.0 F.  If you have persistent nausea, contact IR and we can prescribe anti-nausea medication.     - Please report chills, temperature > 101.0, persistent nausea or vomiting, severe pain, confusion, yellowing of the skin, or abdominal swelling.    - A follow up phone call will be performed the day after the procedure.     - Please contact the Nurse Practitioner with any questions or concerns directly at [phone] from the hours of 8 am to 6 pm, [facility].  After 6 pm and on weekends, please call [phone] and ask to speak with the "Radiology Resident on-call".  When you speak with the resident, let them know what procedure you had and they will get in touch with the Interventional Radiology physician that is on-call.

## 2022-01-20 NOTE — ASU PATIENT PROFILE, ADULT - NSICDXPASTMEDICALHX_GEN_ALL_CORE_FT
PAST MEDICAL HISTORY:  Anxiety and depression     Chronic lower back pain     ETOH abuse     H/O ETOH abuse     HCC (hepatocellular carcinoma) Stage III    History of morbid obesity     History of subdural hematoma s/p fall 12/06/2015 - stable, no intervention    HTN (hypertension)     Liver cirrhosis     Morbid obesity BMI 40.5    Osteoarthritis of both knees     Portal vein thrombosis secondary to HCC invasion     Seasonal allergies

## 2022-01-20 NOTE — ASU DISCHARGE PLAN (ADULT/PEDIATRIC) - NURSING INSTRUCTIONS
Please feel free to contact us at [phone] if any problems arise. After 6PM, Monday through Friday, on weekends and on holidays, please call [phone] and ask for the radiology resident on call to be paged.

## 2022-01-20 NOTE — PRE-ANESTHESIA EVALUATION ADULT - NSANTHOSAYNRD_GEN_A_CORE
No. YEU screening performed.  STOP BANG Legend: 0-2 = LOW Risk; 3-4 = INTERMEDIATE Risk; 5-8 = HIGH Risk

## 2022-01-20 NOTE — ASU DISCHARGE PLAN (ADULT/PEDIATRIC) - NS MD DC FALL RISK RISK
For information on Fall & Injury Prevention, visit: [email or web address] OR  [email or web address] OR  https://www.cdc.gov/steadi/patient.html

## 2022-01-20 NOTE — PROCEDURE NOTE - PLAN
-Patient to go to NM scan today and brought back for treatment at a later date -Patient to go to NM scan today and brought back for treatment at a later date.

## 2022-01-20 NOTE — PRE PROCEDURE NOTE - PRE PROCEDURE EVALUATION
Interventional Radiology  HPI: 71 y.o. female with h/o HTN, obesity (BMI 40.5), OA s/p Left TKR, depression, ETOH abuse in the past (4-5 vodkas a night years ago, recently 1 glass of wine a night, none for one month), recent blood work and diagnostic imaging revealed elevated liver enzymes, fatty liver disease, WILEY + ETOH cirrhosis, concern for infiltrative HCC, and portal vein thrombosis who presents for Mapping Angio and NM Scan and liver biopsy.    Allergies: penicillin (Anaphylaxis)    Exam  General: No acute distress  Chest: Non labored breathing  Abdomen: Non-distended  Extremities: No swelling, warm    -WBC 7.10 / HgB 13.7 / Hct 42.1 / Plt 119  -Na 138 / Cl 102 / BUN 10 / Glucose 124  -K 3.8 / CO2 24 / Cr 0.59  -ALT 37 / Alk Phos 142 / T.Bili 1.1  -INR1.12    Imaging: reviewed    Plan: 71y Female presents for Mapping angiogram possible embolization w/ mapping study & liver biopsy  -Risks/Benefits/alternatives explained with the patient and witnessed informed consent obtained.    conducted a detailed discussion... I had a detailed discussion with the patient and/or guardian regarding the historical points, exam findings, and any diagnostic results supporting the discharge/admit diagnosis.

## 2022-01-20 NOTE — PROCEDURE NOTE - PROCEDURE FINDINGS AND DETAILS
SMA angiogram. Chronically occluded portal vein. Right hepatic and left hepatic branches visualized. Tc-99 MAA injected into right hepatic branch. -SMA angiogram. Chronically occluded portal vein. Right hepatic and left hepatic branches visualized. Tc-99 MAA injected into right hepatic branch.

## 2022-01-20 NOTE — ASU PATIENT PROFILE, ADULT - FALL HARM RISK - HARM RISK INTERVENTIONS

## 2022-01-25 PROBLEM — M17.0 BILATERAL PRIMARY OSTEOARTHRITIS OF KNEE: Chronic | Status: ACTIVE | Noted: 2022-01-01

## 2022-01-25 PROBLEM — J30.2 OTHER SEASONAL ALLERGIC RHINITIS: Chronic | Status: ACTIVE | Noted: 2022-01-01

## 2022-01-25 PROBLEM — E66.01 MORBID (SEVERE) OBESITY DUE TO EXCESS CALORIES: Chronic | Status: ACTIVE | Noted: 2022-01-01

## 2022-01-25 PROBLEM — C22.0 LIVER CELL CARCINOMA: Chronic | Status: ACTIVE | Noted: 2022-01-01

## 2022-01-25 PROBLEM — K74.60 UNSPECIFIED CIRRHOSIS OF LIVER: Chronic | Status: ACTIVE | Noted: 2022-01-01

## 2022-01-25 PROBLEM — Z87.898 PERSONAL HISTORY OF OTHER SPECIFIED CONDITIONS: Chronic | Status: ACTIVE | Noted: 2022-01-01

## 2022-01-25 PROBLEM — I10 ESSENTIAL (PRIMARY) HYPERTENSION: Chronic | Status: ACTIVE | Noted: 2022-01-01

## 2022-01-25 PROBLEM — M54.50 LOW BACK PAIN, UNSPECIFIED: Chronic | Status: ACTIVE | Noted: 2022-01-01

## 2022-01-25 PROBLEM — F10.10 ALCOHOL ABUSE, UNCOMPLICATED: Chronic | Status: ACTIVE | Noted: 2022-01-01

## 2022-01-25 PROBLEM — F10.11 ALCOHOL ABUSE, IN REMISSION: Chronic | Status: ACTIVE | Noted: 2022-01-01

## 2022-01-25 PROBLEM — Z86.79 PERSONAL HISTORY OF OTHER DISEASES OF THE CIRCULATORY SYSTEM: Chronic | Status: ACTIVE | Noted: 2022-01-01

## 2022-01-25 PROBLEM — F41.9 ANXIETY DISORDER, UNSPECIFIED: Chronic | Status: ACTIVE | Noted: 2022-01-01

## 2022-02-07 NOTE — ASU PATIENT PROFILE, ADULT - FALL HARM RISK - HARM RISK INTERVENTIONS

## 2022-02-07 NOTE — PRE PROCEDURE NOTE - PRE PROCEDURE EVALUATION
HPI: 71y Female with cirrhosis and infiltrative HCC s/p mapping angiogram with lung shunt study presents for y90 radioembolization of infiltrative HCC.    Allergies: penicillin (Anaphylaxis)    Medications (Abx/Cardiac/Anticoagulation/Blood Products)      Data:  162.6  63.5  T(C): 37  HR: 78  BP: 145/67  RR: 16  SpO2: 94%    Exam  General: NAD  Chest: Nonlabored breathing  Extremities: WWP    -WBC 5.07 / HgB 13.2 / Hct 39.7 / Plt 104      Imaging: Reviewed    Plan:   -71y Female presents for y90 radioembolization of infiltrative HCC.  -Risks/Benefits/alternatives explained with the patient and/or healthcare proxy and witnessed informed consent obtained.

## 2022-02-07 NOTE — ASU DISCHARGE PLAN (ADULT/PEDIATRIC) - ASU DC SPECIAL INSTRUCTIONSFT
Post S[facility] Discharge Instructions    - You underwent a radioembolization to treat your liver tumor (s) on 2/7/2022 by Dr Navas    - Monitor right groin site for symptoms of bleeding, hard area underneath the skin, bruising, numbness, intense pain, or inability to move.  If you have any of these symptoms, contact your doctor and seek immediate medical attention    - You may have mild abdominal pain, nausea, loss of appetite, fatigue, and/or low grade fever.  These are normal after your procedure and they should resolve within 3-5 days.  Please call Interventional Radiology if you have a fever > 102.0 F.  If you have persistent nausea, contact IR and we can prescribe anti-nausea medication.     - Please report chills, temperature > 101.0, persistent nausea or vomiting, severe pain, confusion, yellowing of the skin, or abdominal swelling.    - Please refer to the " Post [facility] Radiation Safety Instructions" sheet that was provided.  Please adhere to them for 72 hours after your procedure.     - Continue your PPI (Nexium, Prilosec, Protonix) for 30 days post procedure.    - Start Medrol dose pack the day after your [facility] procedure (unless otherwise instructed).    - A follow up phone call will be performed the day after the procedure.     - Blood work is to be performed 2 weeks after your procedure (you will be given a paper prescription).  To locate the closest [facility] lab, call [phone]. If you have labwork performed at a NON-[facility] lab, please request that the results be faxed to [phone].      - Please call the IR booking department at [phone] to schedule a follow up visit with Dr Navas in 1 month.     - Follow up with your hepatologist or oncologist in 2 weeks.     - Post procedure imaging study is to be performed 2 months post procedure (CT or MRI).  You will be given a prescription for this at your initial 1 month follow up visit. Our booking office will obtain authorization from your insurance company, if required.    - Please contact the Nurse Practitioner with any questions or concerns directly at [phone] or [phone] from 8 am to 6 pm, Monday - Friday.  If after weekday hours, on weekends or holidays, please call [phone] and ask to speak with the "Interventional Radiology Resident on-call".  When you speak with the Resident, let them know what procedure you had and they will get in touch with the Interventional Radiology Attending Physician who is on-call.

## 2022-02-07 NOTE — ASU DISCHARGE PLAN (ADULT/PEDIATRIC) - NS MD DC FALL RISK RISK
For information on Fall & Injury Prevention, visit: [email or web address] OR  [email or web address] OR  https://www.cdc.gov/steadi/patient.html

## 2022-02-07 NOTE — ASU DISCHARGE PLAN (ADULT/PEDIATRIC) - NURSING INSTRUCTIONS
Please feel free to contact us at [phone] if any problems arise. After 6PM, Monday through Friday, on weekends and on holidays, please call [phone] and ask for the radiology resident on call to be paged.
- holding Coumadin  - continue metoprolol

## 2022-02-07 NOTE — PROCEDURE NOTE - PROCEDURE FINDINGS AND DETAILS
Right CFA access. Successful y90 radioembolization from the right hepatic artery. Hemostasis with manual compression.    For full details please see pending report in PACS.

## 2022-03-09 PROBLEM — K75.81 NASH (NONALCOHOLIC STEATOHEPATITIS): Status: ACTIVE | Noted: 2021-01-01

## 2022-03-09 PROBLEM — I81 PVT (PORTAL VEIN THROMBOSIS): Status: ACTIVE | Noted: 2021-01-01

## 2022-05-19 PROBLEM — R17 ELEVATED BILIRUBIN: Status: ACTIVE | Noted: 2022-01-01

## 2022-07-04 PROBLEM — K74.69 CIRRHOSIS, CRYPTOGENIC: Status: ACTIVE | Noted: 2021-01-01

## 2022-07-04 NOTE — PHYSICAL EXAM
[Capable of only limited self care, confined to bed or chair more than 50% of waking hours] : Status 3- Capable of only limited self care, confined to bed or chair more than 50% of waking hours [Normal] : affect appropriate [de-identified] : b/l LE edema [de-identified] : limited ROM in lower extremities  [de-identified] : flat affect

## 2022-07-04 NOTE — HISTORY OF PRESENT ILLNESS
[Disease: _____________________] : Disease: [unfilled] [AJCC Stage: ____] : AJCC Stage: [unfilled] [de-identified] : 72 y/o F w/ hx of obesity, WILEY/alcohol associated cirrhosis, s/p L knee replacement with resulting physical debility referred for further evaluation of advanced HCC. \par \par Maryanne was found to have elevated LFTs which prompted an MRI abd on 12/14/21 which showed cirrhosis with evidence of paraesophageal splenorenal varices, thrombosis of the R portal vein, suspicious enhancing nodule in segment 8. Patchy infiltrative arterial phase enhancement throughout segment 5, 8, 4A, 4B measuring 10.7 x 7.4 cm with suggestion of washout on portal venous and equilibrium phase highly suspicious for infiltrative HCC with restricted diffusion.\par \par Referred to medical oncology for further evaluation. \par \par 2/7/22: SIRT to R lobe\par 5/12/22; MRI Abd: demonstrates slightly decreased overall hypervascularity associated with extensive infiltrative enhancing tumor with washout, more extensive in the right lobe, especially segments 5, 8 and 4, when compared with pretreatment MRI. A 1.6 cm T2 hyperintense cystic focus in the right lobe has decreased in size.\par  [de-identified] : pt here for f/u, accompanied by . Clinically unchanged. In a wheelchair, requires help with ADLs. Labs at the end of May reviewed, Tbili 2.9 noted.  [de-identified] : n/a

## 2022-07-20 PROBLEM — R11.2 NAUSEA AND VOMITING: Status: ACTIVE | Noted: 2022-01-01

## 2022-07-20 NOTE — HISTORY OF PRESENT ILLNESS
[Disease: _____________________] : Disease: [unfilled] [AJCC Stage: ____] : AJCC Stage: [unfilled] [de-identified] : 70 y/o F w/ hx of obesity, WILEY/alcohol associated cirrhosis, s/p L knee replacement with resulting physical debility referred for further evaluation of advanced HCC. \par \par Maryanne was found to have elevated LFTs which prompted an MRI abd on 12/14/21 which showed cirrhosis with evidence of paraesophageal splenorenal varices, thrombosis of the R portal vein, suspicious enhancing nodule in segment 8. Patchy infiltrative arterial phase enhancement throughout segment 5, 8, 4A, 4B measuring 10.7 x 7.4 cm with suggestion of washout on portal venous and equilibrium phase highly suspicious for infiltrative HCC with restricted diffusion.\par \par Referred to medical oncology for further evaluation. \par \par 2/7/22: SIRT to R lobe\par 5/12/22; MRI Abd: demonstrates slightly decreased overall hypervascularity associated with extensive infiltrative enhancing tumor with washout, more extensive in the right lobe, especially segments 5, 8 and 4, when compared with pretreatment MRI. A 1.6 cm T2 hyperintense cystic focus in the right lobe has decreased in size.\par 6/20/22: C1 Nivolumab\par 7/18/22: C2 Nivolumab [de-identified] : n/a [Therapy: ___] : Therapy: [unfilled] [Cycle: ___] : Cycle: [unfilled] [Day: ___] : Day: [unfilled] [de-identified] : Patient was seen at the infusion room while receiving treatment. Approximately 10 minutes into treatment, she experienced sharp low back pain episodes; infusion was subsequently stopped and she experienced 1 more episode. Patient received Solu-Cortef 100 mg IVP and remained on surveillance for approximately 30 minutes. She was able to receive the remainder of her therapy without any issues. Patient denied experienced any treatment related side effects since C1 last month. She continues to require help with ADLs, makes a conscious effort to maintain stable appetite + weight (mild nausea noted), denied inconsistent bowel movements at this time (uses docusate for constipation relief).

## 2022-07-20 NOTE — PHYSICAL EXAM
[Capable of only limited self care, confined to bed or chair more than 50% of waking hours] : Status 3- Capable of only limited self care, confined to bed or chair more than 50% of waking hours [Normal] : affect appropriate [de-identified] : b/l LE edema [de-identified] : limited ROM in lower extremities  [de-identified] : flat affect

## 2022-07-24 NOTE — H&P ADULT - NSTOBACCOSCREENHP_GEN_A_NCS
capsule 100 mg  100 mg Oral BID PRN Lady Sushma MD        magnesium hydroxide (MILK OF MAGNESIA) 400 MG/5ML suspension 30 mL  30 mL Oral Daily PRN Lady Sushma MD        aspirin EC tablet 81 mg  81 mg Oral Daily Lady Sushma MD   81 mg at 06/26/18 1143    cloNIDine (CATAPRES) tablet 0.1 mg  0.1 mg Oral Q2H PRN Lady Sushma MD        HYDROcodone-acetaminophen (NORCO) 5-325 MG per tablet 1 tablet  1 tablet Oral Q4H PRN Lady Sushma MD        Or    HYDROcodone-acetaminophen (NORCO) 5-325 MG per tablet 2 tablet  2 tablet Oral Q4H PRN Lady Sushma MD   2 tablet at 06/25/18 2109    morphine (PF) injection 2 mg  2 mg Intravenous Q3H PRN Lady Sushma MD   2 mg at 06/24/18 2322    sodium chloride flush 0.9 % injection 10 mL  10 mL Intravenous 2 times per day Lady Sushma MD   10 mL at 06/26/18 1144    sodium chloride flush 0.9 % injection 10 mL  10 mL Intravenous PRN Lady Sushma MD   10 mL at 06/22/18 1554    ampicillin-sulbactam (UNASYN) 1.5 g IVPB minibag  1.5 g Intravenous Q24H Lenard Lambert DO   Stopped at 06/25/18 1655    heparin flush 100 UNIT/ML injection 200 Units  200 Units Intercatheter PRN Lady Sushma MD        insulin lispro (HUMALOG) injection vial 0-35 Units  0-35 Units Subcutaneous 4x Daily AC & HS Lady Sushma MD   Stopped at 06/24/18 1223    LORazepam (ATIVAN) injection 1 mg  1 mg Intravenous Q4H PRN Lady Sushma MD        metoprolol tartrate (LOPRESSOR) tablet 25 mg  25 mg Oral BID Lady Sushma MD   25 mg at 06/26/18 1144    ipratropium-albuterol (DUONEB) nebulizer solution 1 ampule  1 ampule Inhalation Q4H PRN Beauty Daily, DO   1 ampule at 06/24/18 2332    tamsulosin (FLOMAX) capsule 0.4 mg  0.4 mg Oral Daily Marleni Kaur PA-C   0.4 mg at 06/26/18 1143    atorvastatin (LIPITOR) tablet 20 mg  20 mg Oral Daily Marleni Kaur PA-C   20 mg at 06/26/18 1143     DVT Prophylaxis: Lovenox 30 mg sq daily    Continuous Infusions:   dextrose         Intake/Output Summary (Last 24 hours) at 06/26/18 1542  Last data filed at 06/26/18 0851   Gross per 24 hour   Intake                0 ml   Output                0 ml   Net                0 ml     CBC:   Recent Labs      06/24/18   0133  06/25/18   0201  06/26/18   0507   WBC  15.5*  16.6*  12.9*   HGB  7.9*  7.5*  7.1*   PLT  538*  619*  610*     BMP:  Recent Labs      06/24/18   0133  06/25/18   0201  06/26/18   0507   NA  133*  131*  135*   K  4.4  4.5  4.1   CL  95*  92*  94*   CO2  22  18*  23   BUN  48*  57*  37*   CREATININE  7.8*  9.2*  6.9*   GLUCOSE  117*  118*  106     ABGs:   Lab Results   Component Value Date    PHART 7.420 06/14/2018    PO2ART 65.0 06/14/2018    JUD9QFZ 38.0 06/14/2018         Objective:   Vitals: BP (!) 168/68 Comment: RN notified  Pulse 68   Temp 98.6 °F (37 °C) (Temporal)   Resp 16   Ht 6' 1\" (1.854 m)   Wt 186 lb 7 oz (84.6 kg)   SpO2 92%   BMI 24.60 kg/m²   General appearance: alert, appears stated age and cooperative  Skin: Skin color, texture, turgor normal.   HEENT: Head: Normocephalic, no lesions, without obvious abnormality.   Neck: no adenopathy, no carotid bruit, no JVD and supple, symmetrical, trachea midline  Lungs: clear to auscultation bilaterally  Heart: regular rate and rhythm, S1, S2 normal, no murmur, click, rub or gallop  Abdomen: soft, non-tender; bowel sounds normal; no masses,  no organomegaly  Extremities: extremities normal, atraumatic, no cyanosis or edema, black toes left foot  Lymphatic: No significant lymph node enlargement papable  Neurologic: Mental status: Alert, oriented, thought content appropriate        Assessment & Plan:    SVT (supraventricular tachycardia)/aflutter w/ RVR - s/p DCCV, now in NSR - on oral amio and metoprolol  BPH (benign prostatic hyperplasia) - on flomax  Essential hypertension    Mixed hyperlipidemia - on statin  AAA (abdominal aortic aneurysm) ([address] - s/p repair 6/11 by Dr. Willard Greene  Type 2 diabetes mellitus with complication, without long-term current use of insulin - on insulin   ESRD - on HD, needs an OP chair    Leukocytosis - on merrem and vanc  HCAP - on ab    Diarrhea - c diff negative, start lactobacillus, KUB now  Anemia of chronic disease      Advance Directive: Full Code              Cleared for dc  Needs OP HD chair  Disposition: home with [facility] vs SNF vs acute rehab- pt and the family deciding     Pattie Alejandro No

## 2022-07-24 NOTE — ED ADULT TRIAGE NOTE - ESI TRIAGE ACUITY LEVEL, MLM
TDap Vaccine administered without difficulty. VIS given. Pt instructed to sit in waiting room for 15 minutes to monitor for s/s of adverse reaction.      
3

## 2022-07-24 NOTE — H&P ADULT - ASSESSMENT
Patient's wife is calling (  verbal on file) and stating that patient is prescribed Insulin Glargine (BASAGLAR KWIKPEN) 100 UNIT/ML injection pen and when the patient went to go pick it up at pharmacy, the pharmacy tried to give them lantus.  Patient would like to know what is going on.  Please advise      Yuridia patel call back [phone]   A/P    B/L leg edema :  -started on IV lasix 40 mg IV bid   check venous doppler / echo   cardio consult Dr. condon     Hx of Hepatocellular carcinoma ( HCC )   -pt. being followed with [facility] / onchology   on active chemo therapy , last dose was few days ago     Unsteady gait / unable to ambulate :  -pt's spouse lives together , but now unable to take care 2/2 pt. became non ambulatory   will need PT eval and likely STR     Depression   -c/w welbutrin XL and trazodone     advance care planning : d/w patient and spouse bedside. d/w her regarding CPR/ Intubation if the need arise. She agrees for everything. remain full code.   time spend 15 min

## 2022-07-24 NOTE — ED ADULT NURSE NOTE - TEMPLATE LIST FOR HEAD TO TOE ASSESSMENT
1. Have you been to the ER, urgent care clinic since your last visit? Hospitalized since your last visit? No    2. Have you seen or consulted any other health care providers outside of the [address] [place] [address] since your last visit? Include any pap smears or colon screening.  No General

## 2022-07-24 NOTE — PATIENT PROFILE ADULT - FALL HARM RISK - HARM RISK INTERVENTIONS

## 2022-07-24 NOTE — PHYSICAL THERAPY INITIAL EVALUATION ADULT - PERTINENT HX OF CURRENT PROBLEM, REHAB EVAL
71y Female complaining of acute on chronic leg swelling. Pt has a hx of HCC s/p radioembolization 2/2022 and currently undergoing tx with nivolumab (last infusion 2 days prior to presentation). States that for the past 3 days has been unable to walk 2/2 leg swelling and pain. Also notes fluid weeping especially from right leg.

## 2022-07-24 NOTE — ED PROVIDER NOTE - OBJECTIVE STATEMENT
The patient is a 71y Female complaining of acute on chronic leg swelling. Pt has a hx of HCC s/p radioembolization 2/2022 and currently undergoing tx with nivolumab (last infusion 2 days prior to presentation). States that for the past 3 days has been unable to walk 2/2 leg swelling and pain. Also notes fluid weeping especially from right leg. Patient states that she takes 20mg lasix ----- x per day. The patient is a 71y Female complaining of acute on chronic leg swelling. Pt has a hx of HCC s/p radioembolization 2/2022 and currently undergoing tx with nivolumab (last infusion 2 days prior to presentation). States that for the past 3 days has been unable to walk 2/2 leg swelling and pain. Also notes fluid weeping especially from right leg. Patient states that she takes 20mg lasix per day, did take her dose today. Patient usually under the care of her  who states that he cannot effectively care for her if she is unable to walk.

## 2022-07-24 NOTE — H&P ADULT - NSHPLABSRESULTS_GEN_ALL_CORE
13.4   7.95  )-----------( 133      ( 24 Jul 2022 09:19 )             40.1     07-24    138  |  102  |  12  ----------------------------<  105<H>  4.2   |  26  |  0.62    Ca    8.5      24 Jul 2022 09:19  Mg     1.9     07-24    TPro  6.3  /  Alb  2.6<L>  /  TBili  4.4<H>  /  DBili  x   /  AST  114<H>  /  ALT  38  /  AlkPhos  191<H>  07-24

## 2022-07-24 NOTE — ED PROVIDER NOTE - PHYSICAL EXAMINATION
Gen- no acute distress   Eyes- mildly icteric   Neck  CV-   Pulm-   GI- diffusely tender, voluntary guarding over URQ and Suprapubic areas   Extremities- 3+ pitting edema extending up 3/4 of LE bilaterally with erythema and weeping especially on right side

## 2022-07-24 NOTE — H&P ADULT - NSHPPHYSICALEXAM_GEN_ALL_CORE
Vital Signs Last 24 Hrs  T(C): 36.9 (24 Jul 2022 15:39), Max: 37.1 (24 Jul 2022 08:17)  T(F): 98.4 (24 Jul 2022 15:39), Max: 98.8 (24 Jul 2022 08:17)  HR: 80 (24 Jul 2022 15:39) (78 - 92)  BP: 157/76 (24 Jul 2022 15:39) (118/52 - 185/140)  BP(mean): --  RR: 17 (24 Jul 2022 15:39) (16 - 17)  SpO2: 100% (24 Jul 2022 15:39) (94% - 100%)    Parameters below as of 24 Jul 2022 15:39  Patient On (Oxygen Delivery Method): nasal cannula  O2 Flow (L/min): 3 pt. seen and examined, NAD    Vital Signs Last 24 Hrs  T(C): 36.9 (24 Jul 2022 15:39), Max: 37.1 (24 Jul 2022 08:17)  T(F): 98.4 (24 Jul 2022 15:39), Max: 98.8 (24 Jul 2022 08:17)  HR: 80 (24 Jul 2022 15:39) (78 - 92)  BP: 157/76 (24 Jul 2022 15:39) (118/52 - 185/140)  BP(mean): --  RR: 17 (24 Jul 2022 15:39) (16 - 17)  SpO2: 100% (24 Jul 2022 15:39) (94% - 100%)    Parameters below as of 24 Jul 2022 15:39  Patient On (Oxygen Delivery Method): nasal cannula  O2 Flow (L/min): 3    heent : nc/at , no pallor   neck: supple, no JVD  lungs : B/L fair A/E , no w/r/r  heart: s1s2 nml  abd : soft, NABS, NT/ND  ext : 3+ edema , some skin breakdown 2/2 edema , pulses 1 +  neuro: aaox3 , no motor deficit  skin : warm , no rash

## 2022-07-24 NOTE — PHYSICAL THERAPY INITIAL EVALUATION ADULT - ADDITIONAL COMMENTS
Pt lives w/ her spouse in a pvt home has 3-4 steps at entry, +LHR required Phyllis w/ mobility from spouse didn't use an AD, however for the past few days pt required a WC for ambulation and more help from the spouse for transfers.

## 2022-07-24 NOTE — ED PROVIDER NOTE - ATTENDING CONTRIBUTION TO CARE
------------ATTENDING NOTE------------ ------------ATTENDING NOTE------------  pt brought to ED by EMS c/o gradually increasing BLE edema, mild aches in both feet/ankles, increasing difficulty walking, near fall yesterday / caught by , no fevers, complicated medical hx, failed PT eval in ED, admission for continued boo, optimize medical mgmt, PT/OT, outpt needs assessments.  - Matty Guerra MD   ---------------------------------------------

## 2022-07-24 NOTE — ED ADULT NURSE NOTE - OBJECTIVE STATEMENT
Patient is a 71year old female BIBA complaining of worsening BLE edema.  at bedside.  states that pt BLE edema has progressively became worse. States pt cannot ambulate as she used to. Upon exam, pt presents with weeping wounds on R lower leg. As per , pt has tremors at baseline. Patient has history of liver cancer. Patient is A&O x4. Pt reports allergy to penicillin. Denies complaints of chest pain, sob, fevers, chills, n/v/d, headache, syncope, burning urination, blood in urine, blood in stool. Color is consistent with ethnicity. VS documented. Safety and comfort maintained. Will continue to monitor.

## 2022-07-24 NOTE — ED ADULT NURSE NOTE - CCCP TRG CHIEF CMPLNT
Atrial fibrillation Atrial fibrillation Atrial fibrillation Atrial fibrillation Atrial fibrillation lower extremity swelling

## 2022-07-24 NOTE — ED PROVIDER NOTE - ALLERGIC/IMMUNOLOGIC NEGATIVE STATEMENT, MLM
no dermatitis, no environmental allergies, no food allergies, no immunosuppressive disorder, and no pruritus. no environmental allergies, no food allergies, no immunosuppressive disorder, and no pruritus.

## 2022-07-25 NOTE — CONSULT NOTE ADULT - SUBJECTIVE AND OBJECTIVE BOX
DATE OF SERVICE: 07-25-22      CHIEF COMPLAINT:Patient is a 71y old  Female who presents with a chief complaint of B/L leg edema , difficulty ambulation , spouse can't take care at home (24 Jul 2022 11:24)      HISTORY OF PRESENT ILLNESS:HPI:  The patient is a 71y Female complaining of acute on chronic leg swelling. Pt has a hx of HCC s/p radioembolization 2/2022 and currently undergoing tx with nivolumab (last infusion 2 days prior to presentation). States that for the past 3 days has been unable to walk 2/2 leg swelling and pain. Also notes fluid weeping especially from right leg. Patient states that she takes 20mg lasix per day, did take her dose today. Patient usually under the care of her  who states that he cannot effectively care for her if she is unable to walk.  denies any SOB , no CP , no fever/ chills  (24 Jul 2022 11:24)      PAST MEDICAL & SURGICAL HISTORY:  Osteoarthritis of both knees      HTN (hypertension)      History of morbid obesity      HCC (hepatocellular carcinoma)  Stage III      H/O ETOH abuse      ETOH abuse      Anxiety and depression      Liver cirrhosis      Chronic lower back pain      History of subdural hematoma  s/p fall 12/06/2015 - stable, no intervention      Seasonal allergies      Morbid obesity  BMI 40.5      Portal vein thrombosis secondary to HCC invasion      H/O total knee replacement, left  2019              MEDICATIONS:  furosemide   Injectable 40 milliGRAM(s) IV Push two times a day  heparin   Injectable 5000 Unit(s) SubCutaneous every 12 hours        buPROPion XL (24-Hour) . 150 milliGRAM(s) Oral daily  traZODone 100 milliGRAM(s) Oral at bedtime    pantoprazole    Tablet 40 milliGRAM(s) Oral before breakfast      folic acid 1 milliGRAM(s) Oral daily      FAMILY HISTORY:      Non-contributory    SOCIAL HISTORY:    [ ] not a smoker    Allergies    penicillin (Anaphylaxis)    Intolerances    	    REVIEW OF SYSTEMS:  CONSTITUTIONAL: No fever  EYES: No eye pain, visual disturbances, or discharge  ENMT:  No difficulty hearing, tinnitus  NECK: No pain or stiffness  RESPIRATORY: No cough, wheezing,  CARDIOVASCULAR: No chest pain, palpitations, passing out, dizziness, + leg swelling  GASTROINTESTINAL:  No nausea, vomiting, diarrhea or constipation. No melena.  GENITOURINARY: No dysuria, hematuria  NEUROLOGICAL: No stroke like symptoms  SKIN: No burning or lesions   ENDOCRINE: No heat or cold intolerance  MUSCULOSKELETAL: No joint pain or swelling  PSYCHIATRIC: No  anxiety, mood swings  HEME/LYMPH: No bleeding gums  ALLERGY AND IMMUNOLOGIC: No hives or eczema	    All other ROS negative    PHYSICAL EXAM:  T(C): 36.9 (07-25-22 @ 20:42), Max: 37.1 (07-25-22 @ 14:08)  HR: 85 (07-25-22 @ 20:42) (79 - 88)  BP: 138/65 (07-25-22 @ 20:42) (120/76 - 156/73)  RR: 18 (07-25-22 @ 20:42) (18 - 18)  SpO2: 92% (07-25-22 @ 20:42) (92% - 98%)  Wt(kg): --  I&O's Summary    24 Jul 2022 07:01  -  25 Jul 2022 07:00  --------------------------------------------------------  IN: 0 mL / OUT: 900 mL / NET: -900 mL    25 Jul 2022 07:01  -  25 Jul 2022 22:09  --------------------------------------------------------  IN: 700 mL / OUT: 1150 mL / NET: -450 mL        Appearance: Normal	  HEENT:   Normal oral mucosa, EOMI	  Cardiovascular:  S1 S2, No JVD,    Respiratory: Lungs clear to auscultation	  Psychiatry: Alert  Gastrointestinal:  Soft, Non-tender, + BS	  Skin: No rashes   Neurologic: Non-focal  Extremities:  No edema  Vascular: Peripheral pulses palpable    	    	  	  CARDIAC MARKERS:  Labs personally reviewed by me                                  13.8   10.08 )-----------( 141      ( 25 Jul 2022 08:23 )             42.7     07-25    135  |  98  |  14  ----------------------------<  81  4.0   |  24  |  0.67    Ca    8.5      25 Jul 2022 08:23  Mg     1.9     07-24    TPro  6.3  /  Alb  2.5<L>  /  TBili  4.8<H>  /  DBili  x   /  AST  104<H>  /  ALT  34  /  AlkPhos  197<H>  07-25          EKG: Personally reviewed by me - NSR nonspecific ST changes  Radiology: Personally reviewed by me - CXR clear lungs      Assessment:  · Assessment	  A/P    1. B/L leg edema : 2/2 HCC and less likely 2/2 heart failure as no associated SOB  -started on IV lasix 40 mg IV bid   check venous doppler / echo   - echo    2. Hx of Hepatocellular carcinoma ( HCC )   -pt. being followed with [facility]  on active chemo therapy , last dose was few days ago     3.mUnsteady gait / unable to ambulate :  will need PT eval and likely STR            Differential diagnosis and plan of care discussed with patient after the evaluation. Counseling on diet, nutritional counseling, weight management, exercise and medication compliance was done.   Advanced care planning/advanced directives discussed with patient/family. DNR status including forceful chest compressions to attempt to restart the heart, ventilator support/artificial breathing, electric shock, artificial nutrition, health care proxy, Molst form all discussed with pt. Pt wishes to consider. More than fifteen minutes spent on discussing advanced directives.        Geoffrey Batista DO [facility]  Cardiovascular Medicine  [address] [place] [address], [address]  Office [phone]  Cell [phone]

## 2022-07-26 NOTE — PROGRESS NOTE ADULT - SUBJECTIVE AND OBJECTIVE BOX
Patient is a 71y old  Female who presents with a chief complaint of B/L leg edema , difficulty ambulation , spouse can't take care at home (26 Jul 2022 09:22)      INTERVAL HPI/OVERNIGHT EVENTS: seen and examined , leg edema little better   T(C): 36.6 (07-27-22 @ 00:44), Max: 37 (07-26-22 @ 13:00)  HR: 78 (07-27-22 @ 00:44) (78 - 84)  BP: 111/65 (07-27-22 @ 00:44) (110/69 - 163/71)  RR: 18 (07-27-22 @ 00:44) (18 - 18)  SpO2: 96% (07-27-22 @ 00:44) (92% - 97%)  Wt(kg): --  I&O's Summary    25 Jul 2022 07:01  -  26 Jul 2022 07:00  --------------------------------------------------------  IN: 700 mL / OUT: 2150 mL / NET: -1450 mL    26 Jul 2022 07:01  -  27 Jul 2022 01:19  --------------------------------------------------------  IN: 620 mL / OUT: 1670 mL / NET: -1050 mL        PAST MEDICAL & SURGICAL HISTORY:  Osteoarthritis of both knees      HTN (hypertension)      History of morbid obesity      HCC (hepatocellular carcinoma)  Stage III      H/O ETOH abuse      ETOH abuse      Anxiety and depression      Liver cirrhosis      Chronic lower back pain      History of subdural hematoma  s/p fall 12/06/2015 - stable, no intervention      Seasonal allergies      Morbid obesity  BMI 40.5      Portal 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 No nausea, vomiting, or hematemesis; No diarrhea or constipation. No melena or hematochezia.  GENITOURINARY: No dysuria, frequency, hematuria, or incontinence  NEUROLOGICAL: No headaches, memory loss, loss of strength, numbness, or tremors  SKIN: No itching, burning, rashes, or lesions   LYMPH NODES: No enlarged glands  ENDOCRINE: No heat or cold intolerance; No hair loss  MUSCULOSKELETAL: No joint pain or swelling; No muscle, back, or extremity pain  PSYCHIATRIC: No depression, anxiety, mood swings, or difficulty sleeping  HEME/LYMPH: No easy bruising, or bleeding gums  ALLERY AND IMMUNOLOGIC: No hives or eczema    RADIOLOGY & ADDITIONAL TESTS:    Imaging Personally Reviewed:  [ ] YES  [ ] NO    Consultant(s) Notes Reviewed:  [ ] YES  [ ] NO    PHYSICAL EXAM:  GENERAL: NAD, well-groomed, well-developed  HEAD:  Atraumatic, Normocephalic  EYES: EOMI, PERRLA, conjunctiva and sclera clear  ENMT: No tonsillar erythema, exudates, or enlargement; Moist mucous membranes, Good dentition, No lesions  NECK: Supple, No JVD, Normal thyroid  NERVOUS SYSTEM:  Alert & Oriented X3, Good concentration; Motor Strength 5/5 B/L upper and lower extremities; DTRs 2+ intact and symmetric  CHEST/LUNG: Clear to percussion bilaterally; No rales, rhonchi, wheezing, or rubs  HEART: Regular rate and rhythm; No murmurs, rubs, or gallops  ABDOMEN: Soft, Nontender, Nondistended; Bowel sounds present  EXTREMITIES:  2+ Peripheral Pulses, No clubbing, cyanosis, or edema  LYMPH: No lymphadenopathy noted  SKIN: No rashes or lesions    LABS:                        13.8   10.08 )-----------( 141      ( 25 Jul 2022 08:23 )             42.7     07-26    136  |  99  |  15  ----------------------------<  97  4.0   |  27  |  0.62    Ca    8.1<L>      26 Jul 2022 06:51    TPro  6.3  /  Alb  2.5<L>  /  TBili  4.8<H>  /  DBili  x   /  AST  104<H>  /  ALT  34  /  AlkPhos  197<H>  07-25        CAPILLARY BLOOD GLUCOSE                MEDICATIONS  (STANDING):  buPROPion XL (24-Hour) . 150 milliGRAM(s) Oral daily  folic acid 1 milliGRAM(s) Oral daily  furosemide   Injectable 40 milliGRAM(s) IV Push two times a day  heparin   Injectable 5000 Unit(s) SubCutaneous every 12 hours  pantoprazole    Tablet 40 milliGRAM(s) Oral before breakfast  traZODone 100 milliGRAM(s) Oral at bedtime    MEDICATIONS  (PRN):      Care Discussed with Consultants/Other Providers [ ] YES  [ ] NO

## 2022-07-26 NOTE — PROGRESS NOTE ADULT - SUBJECTIVE AND OBJECTIVE BOX
DATE OF SERVICE: 07-26-22 @ 09:22    Patient is a 71y old  Female who presents with a chief complaint of B/L leg edema , difficulty ambulation , spouse can't take care at home (25 Jul 2022 19:44)      INTERVAL HISTORY: Feels ok.     REVIEW OF SYSTEMS:  CONSTITUTIONAL: No weakness  EYES/ENT: No visual changes;  No throat pain   NECK: No pain or stiffness  RESPIRATORY: No cough, wheezing; No shortness of breath  CARDIOVASCULAR: No chest pain or palpitations  GASTROINTESTINAL: No abdominal  pain. No nausea, vomiting, or hematemesis  GENITOURINARY: No dysuria, frequency or hematuria  NEUROLOGICAL: No stroke like symptoms  SKIN: No rashes    	  MEDICATIONS:  furosemide   Injectable 40 milliGRAM(s) IV Push two times a day        PHYSICAL EXAM:  T(C): 36.8 (07-26-22 @ 09:02), Max: 37.1 (07-25-22 @ 14:08)  HR: 79 (07-26-22 @ 09:02) (78 - 88)  BP: 136/67 (07-26-22 @ 09:02) (133/77 - 163/71)  RR: 18 (07-26-22 @ 09:02) (18 - 18)  SpO2: 93% (07-26-22 @ 09:02) (92% - 97%)  Wt(kg): --  I&O's Summary    25 Jul 2022 07:01  -  26 Jul 2022 07:00  --------------------------------------------------------  IN: 700 mL / OUT: 2150 mL / NET: -1450 mL    26 Jul 2022 07:01  -  26 Jul 2022 09:22  --------------------------------------------------------  IN: 100 mL / OUT: 100 mL / NET: 0 mL          Appearance: In no distress	  HEENT:    PERRL, EOMI	  Cardiovascular:  S1 S2, No JVD  Respiratory: Lungs clear to auscultation	  Gastrointestinal:  Soft, Non-tender, + BS	  Vascularature:  No edema of LE  Psychiatric: Appropriate affect   Neuro: no acute focal deficits                               13.8   10.08 )-----------( 141      ( 25 Jul 2022 08:23 )             42.7     07-26    136  |  99  |  15  ----------------------------<  97  4.0   |  27  |  0.62    Ca    8.1<L>      26 Jul 2022 06:51    TPro  6.3  /  Alb  2.5<L>  /  TBili  4.8<H>  /  DBili  x   /  AST  104<H>  /  ALT  34  /  AlkPhos  197<H>  07-25        Labs personally reviewed        ASSESSMENT/PLAN: 	    1. B/L leg edema : 2/2 HCC and less likely 2/2 heart failure as no associated SOB  - started on IV lasix 40 mg IV bid   - proBNP 207  - CXR with normal heart size, no pneumothorax, pleural effusion or consolidation  - check venous doppler / echo       2. Hx of Hepatocellular carcinoma ( HCC )   -pt. being followed [facility]  on active chemo therapy , last dose was few days ago     3.Unsteady gait / unable to ambulate :  will need PT eval and likely STR         Belinda Ariza, ISABELA-NP   Geoffrey Batista DO [facility]  Cardiovascular Medicine  [address], [address]  Office: [phone]  Cell: [phone]

## 2022-07-27 NOTE — PROGRESS NOTE ADULT - SUBJECTIVE AND OBJECTIVE BOX
Patient is a 71y old  Female who presents with a chief complaint of B/L leg edema , difficulty ambulation , spouse can't take care at home (27 Jul 2022 10:26)      INTERVAL HPI/OVERNIGHT EVENTS:  T(C): 36.8 (07-27-22 @ 22:03), Max: 36.9 (07-27-22 @ 06:51)  HR: 78 (07-27-22 @ 22:03) (76 - 81)  BP: 134/59 (07-27-22 @ 22:03) (111/65 - 151/70)  RR: 18 (07-27-22 @ 22:03) (18 - 18)  SpO2: 92% (07-27-22 @ 22:03) (92% - 96%)  Wt(kg): --  I&O's Summary    26 Jul 2022 07:01  -  27 Jul 2022 07:00  --------------------------------------------------------  IN: 620 mL / OUT: 2270 mL / NET: -1650 mL    27 Jul 2022 07:01  -  27 Jul 2022 23:53  --------------------------------------------------------  IN: 440 mL / OUT: 1650 mL / NET: -1210 mL        PAST MEDICAL & SURGICAL HISTORY:  Osteoarthritis of both knees      HTN (hypertension)      History of morbid obesity      HCC (hepatocellular carcinoma)  Stage III      H/O ETOH abuse      ETOH abuse      Anxiety and depression      Liver cirrhosis      Chronic lower back pain      History of subdural hematoma  s/p fall 12/06/2015 - stable, no intervention      Seasonal allergies      Morbid obesity  BMI 40.5      Portal 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 No nausea, vomiting, or hematemesis; No diarrhea or constipation. No melena or hematochezia.  GENITOURINARY: No dysuria, frequency, hematuria, or incontinence  NEUROLOGICAL: No headaches, memory loss, loss of strength, numbness, or tremors  SKIN: No itching, burning, rashes, or lesions   LYMPH NODES: No enlarged glands  ENDOCRINE: No heat or cold intolerance; No hair loss  MUSCULOSKELETAL: No joint pain or swelling; No muscle, back, or extremity pain  PSYCHIATRIC: No depression, anxiety, mood swings, or difficulty sleeping  HEME/LYMPH: No easy bruising, or bleeding gums  ALLERY AND IMMUNOLOGIC: No hives or eczema    RADIOLOGY & ADDITIONAL TESTS:    Imaging Personally Reviewed:  [ ] YES  [ ] NO    Consultant(s) Notes Reviewed:  [ ] YES  [ ] NO    PHYSICAL EXAM:  GENERAL: NAD, well-groomed, well-developed  HEAD:  Atraumatic, Normocephalic  EYES: EOMI, PERRLA, conjunctiva and sclera clear  ENMT: No tonsillar erythema, exudates, or enlargement; Moist mucous membranes, Good dentition, No lesions  NECK: Supple, No JVD, Normal thyroid  NERVOUS SYSTEM:  Alert & Oriented X3, Good concentration; Motor Strength 5/5 B/L upper and lower extremities; DTRs 2+ intact and symmetric  CHEST/LUNG: Clear to percussion bilaterally; No rales, rhonchi, wheezing, or rubs  HEART: Regular rate and rhythm; No murmurs, rubs, or gallops  ABDOMEN: Soft, Nontender, Nondistended; Bowel sounds present  EXTREMITIES:  2+ Peripheral Pulses, No clubbing, cyanosis, or edema  LYMPH: No lymphadenopathy noted  SKIN: No rashes or lesions    LABS:                        13.9   7.41  )-----------( 129      ( 27 Jul 2022 07:38 )             40.8     07-27    140  |  97  |  15  ----------------------------<  92  3.1<L>   |  31  |  0.63    Ca    8.4      27 Jul 2022 07:38          CAPILLARY BLOOD GLUCOSE                MEDICATIONS  (STANDING):  buPROPion XL (24-Hour) . 150 milliGRAM(s) Oral daily  folic acid 1 milliGRAM(s) Oral daily  furosemide   Injectable 40 milliGRAM(s) IV Push two times a day  heparin   Injectable 5000 Unit(s) SubCutaneous every 12 hours  pantoprazole    Tablet 40 milliGRAM(s) Oral before breakfast  traZODone 100 milliGRAM(s) Oral at bedtime    MEDICATIONS  (PRN):      Care Discussed with Consultants/Other Providers [ ] YES  [ ] NO Patient is a 71y old  Female who presents with a chief complaint of B/L leg edema , difficulty ambulation , spouse can't take care at home (27 Jul 2022 10:26)      INTERVAL HPI/OVERNIGHT EVENTS: seen and examined, leg edema decreasing   T(C): 36.8 (07-27-22 @ 22:03), Max: 36.9 (07-27-22 @ 06:51)  HR: 78 (07-27-22 @ 22:03) (76 - 81)  BP: 134/59 (07-27-22 @ 22:03) (111/65 - 151/70)  RR: 18 (07-27-22 @ 22:03) (18 - 18)  SpO2: 92% (07-27-22 @ 22:03) (92% - 96%)  Wt(kg): --  I&O's Summary    26 Jul 2022 07:01  -  27 Jul 2022 07:00  --------------------------------------------------------  IN: 620 mL / OUT: 2270 mL / NET: -1650 mL    27 Jul 2022 07:01  -  27 Jul 2022 23:53  --------------------------------------------------------  IN: 440 mL / OUT: 1650 mL / NET: -1210 mL        PAST MEDICAL & SURGICAL HISTORY:  Osteoarthritis of both knees      HTN (hypertension)      History of morbid obesity      HCC (hepatocellular carcinoma)  Stage III      H/O ETOH abuse      ETOH abuse      Anxiety and depression      Liver cirrhosis      Chronic lower back pain      History of subdural hematoma  s/p fall 12/06/2015 - stable, no intervention      Seasonal allergies      Morbid obesity  BMI 40.5      Portal 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 No nausea, vomiting, or hematemesis; No diarrhea or constipation. No melena or hematochezia.  GENITOURINARY: No dysuria, frequency, hematuria, or incontinence  NEUROLOGICAL: No headaches, memory loss, loss of strength, numbness, or tremors  SKIN: No itching, burning, rashes, or lesions   LYMPH NODES: No enlarged glands  ENDOCRINE: No heat or cold intolerance; No hair loss  MUSCULOSKELETAL: No joint pain or swelling; No muscle, back, or extremity pain  PSYCHIATRIC: No depression, anxiety, mood swings, or difficulty sleeping  HEME/LYMPH: No easy bruising, or bleeding gums  ALLERY AND IMMUNOLOGIC: No hives or eczema    RADIOLOGY & ADDITIONAL TESTS:    Imaging Personally Reviewed:  [ ] YES  [ ] NO    Consultant(s) Notes Reviewed:  [ ] YES  [ ] NO    PHYSICAL EXAM:  GENERAL: NAD, well-groomed, well-developed  HEAD:  Atraumatic, Normocephalic  EYES: EOMI, PERRLA, conjunctiva and sclera clear  ENMT: No tonsillar erythema, exudates, or enlargement; Moist mucous membranes, Good dentition, No lesions  NECK: Supple, No JVD, Normal thyroid  NERVOUS SYSTEM:  Alert & Oriented X3, Good concentration; Motor Strength 5/5 B/L upper and lower extremities; DTRs 2+ intact and symmetric  CHEST/LUNG: Clear to percussion bilaterally; No rales, rhonchi, wheezing, or rubs  HEART: Regular rate and rhythm; No murmurs, rubs, or gallops  ABDOMEN: Soft, Nontender, Nondistended; Bowel sounds present  EXTREMITIES:  2+ Peripheral Pulses, No clubbing, cyanosis, or edema  LYMPH: No lymphadenopathy noted  SKIN: No rashes or lesions    LABS:                        13.9   7.41  )-----------( 129      ( 27 Jul 2022 07:38 )             40.8     07-27    140  |  97  |  15  ----------------------------<  92  3.1<L>   |  31  |  0.63    Ca    8.4      27 Jul 2022 07:38          CAPILLARY BLOOD GLUCOSE                MEDICATIONS  (STANDING):  buPROPion XL (24-Hour) . 150 milliGRAM(s) Oral daily  folic acid 1 milliGRAM(s) Oral daily  furosemide   Injectable 40 milliGRAM(s) IV Push two times a day  heparin   Injectable 5000 Unit(s) SubCutaneous every 12 hours  pantoprazole    Tablet 40 milliGRAM(s) Oral before breakfast  traZODone 100 milliGRAM(s) Oral at bedtime    MEDICATIONS  (PRN):      Care Discussed with Consultants/Other Providers [ ] YES  [ ] NO

## 2022-07-27 NOTE — PROGRESS NOTE ADULT - SUBJECTIVE AND OBJECTIVE BOX
DATE OF SERVICE: 07-27-22 @ 10:26    Patient is a 71y old  Female who presents with a chief complaint of B/L leg edema , difficulty ambulation , spouse can't take care at home (26 Jul 2022 19:19)      INTERVAL HISTORY: Feeling slightly better.     REVIEW OF SYSTEMS:  CONSTITUTIONAL: No weakness  EYES/ENT: No visual changes;  No throat pain   NECK: No pain or stiffness  RESPIRATORY: No cough, wheezing; No shortness of breath  CARDIOVASCULAR: No chest pain or palpitations  GASTROINTESTINAL: No abdominal  pain. No nausea, vomiting, or hematemesis  GENITOURINARY: No dysuria, frequency or hematuria  NEUROLOGICAL: No stroke like symptoms  SKIN: No rashes    	  MEDICATIONS:  furosemide   Injectable 40 milliGRAM(s) IV Push two times a day        PHYSICAL EXAM:  T(C): 36.7 (07-27-22 @ 09:41), Max: 37 (07-26-22 @ 13:00)  HR: 79 (07-27-22 @ 09:41) (76 - 84)  BP: 137/62 (07-27-22 @ 09:41) (110/69 - 151/70)  RR: 18 (07-27-22 @ 09:41) (18 - 18)  SpO2: 93% (07-27-22 @ 09:41) (92% - 96%)  Wt(kg): --  I&O's Summary    26 Jul 2022 07:01  -  27 Jul 2022 07:00  --------------------------------------------------------  IN: 620 mL / OUT: 2270 mL / NET: -1650 mL          Appearance: In no distress	  HEENT:    PERRL, EOMI	  Cardiovascular:  S1 S2, No JVD  Respiratory: Lungs clear to auscultation	  Gastrointestinal:  Soft, Non-tender, + BS	  Vascularature:  +2 B/L LE edema  Psychiatric: Appropriate affect   Neuro: no acute focal deficits                               13.9   7.41  )-----------( 129      ( 27 Jul 2022 07:38 )             40.8     07-27    140  |  97  |  15  ----------------------------<  92  3.1<L>   |  31  |  0.63    Ca    8.4      27 Jul 2022 07:38          Labs personally reviewed      ASSESSMENT/PLAN: 	      1. B/L leg edema : 2/2 HCC and less likely 2/2 heart failure as no associated SOB  - started on IV lasix 40 mg IV bid   - proBNP 207  - CXR with normal heart size, no pneumothorax, pleural effusion or consolidation  - check venous doppler - negative for DVT / echo pending      2. Hx of Hepatocellular carcinoma ( HCC )   -pt. being followed with [facility]  on active chemo therapy , last dose was few days ago     3.Unsteady gait / unable to ambulate :  will need PT eval and likely STR         Belinda Ariza, ISABELA-NP   Geoffrey Batista DO [facility]  Cardiovascular Medicine  [address] [place] [address], [address]  Office: [phone]  Cell: [phone] DATE OF SERVICE: 07-27-22 @ 10:26    Patient is a 71y old  Female who presents with a chief complaint of B/L leg edema , difficulty ambulation , spouse can't take care at home (26 Jul 2022 19:19)      INTERVAL HISTORY: Feeling slightly better.     REVIEW OF SYSTEMS:  CONSTITUTIONAL: No weakness  EYES/ENT: No visual changes;  No throat pain   NECK: No pain or stiffness  RESPIRATORY: No cough, wheezing; No shortness of breath  CARDIOVASCULAR: No chest pain or palpitations  GASTROINTESTINAL: No abdominal  pain. No nausea, vomiting, or hematemesis  GENITOURINARY: No dysuria, frequency or hematuria  NEUROLOGICAL: No stroke like symptoms  SKIN: No rashes    	  MEDICATIONS:  furosemide   Injectable 40 milliGRAM(s) IV Push two times a day        PHYSICAL EXAM:  T(C): 36.7 (07-27-22 @ 09:41), Max: 37 (07-26-22 @ 13:00)  HR: 79 (07-27-22 @ 09:41) (76 - 84)  BP: 137/62 (07-27-22 @ 09:41) (110/69 - 151/70)  RR: 18 (07-27-22 @ 09:41) (18 - 18)  SpO2: 93% (07-27-22 @ 09:41) (92% - 96%)  Wt(kg): --  I&O's Summary    26 Jul 2022 07:01  -  27 Jul 2022 07:00  --------------------------------------------------------  IN: 620 mL / OUT: 2270 mL / NET: -1650 mL          Appearance: In no distress	  HEENT:    PERRL, EOMI	  Cardiovascular:  S1 S2, No JVD  Respiratory: Lungs clear to auscultation	  Gastrointestinal:  Soft, Non-tender, + BS	  Vascularature:  +2 B/L LE edema  Psychiatric: Appropriate affect   Neuro: no acute focal deficits                               13.9   7.41  )-----------( 129      ( 27 Jul 2022 07:38 )             40.8     07-27    140  |  97  |  15  ----------------------------<  92  3.1<L>   |  31  |  0.63    Ca    8.4      27 Jul 2022 07:38          Labs personally reviewed    Transthoracic Echocardiogram (07.27.22 @ 10:20) >  1. Normal mitral valve. Minimal mitral regurgitation.  2. Aortic valve not well visualized; appears trileaflet  with normal opening. No aortic valve regurgitation seen.  3. Endocardium not well visualized; hyperdynamic left  ventricular systolic function.  4. The right ventricle is not well visualized; grossly  normal right ventricular size and systolic function.      ASSESSMENT/PLAN: 	      1. B/L leg edema : 2/2 HCC and less likely 2/2 heart failure as no associated SOB  - started on IV lasix 40 mg IV bid   - proBNP 207  - CXR with normal heart size, no pneumothorax, pleural effusion or consolidation  - check venous doppler - negative for DVT / TTE shows EF >75%, hyperdynamic LV systolic function, normal RV  - LE edema improving    2. Hx of Hepatocellular carcinoma ( HCC )   -pt. being followed with [facility]  on active chemo therapy , last dose was few days ago     3.Unsteady gait / unable to ambulate :  will need PT eval and likely STR         Belinda Ariza, ISABELA-NP   Geoffrey Batista DO [facility]  Cardiovascular Medicine  [address], [address]  Office: [phone]  Cell: [phone]

## 2022-07-28 NOTE — DISCHARGE NOTE PROVIDER - CARE PROVIDER_API CALL
Dangelo Kruger)  Gastroenterology; Internal Medicine; Transplant Hepatology  [address] [place] [address]  Phone: [phone]  Fax: [phone]  Follow Up Time:

## 2022-07-28 NOTE — CHART NOTE - NSCHARTNOTEFT_GEN_A_CORE
72 y/o female presented with LE swelling --LE duplex neg for DVT, echo ef 75%, no hf or valvule issue noted; iv Lasix d/c Lasix 40mg po daily ordered as d/w Dr. Schwartz.

## 2022-07-28 NOTE — PROGRESS NOTE ADULT - SUBJECTIVE AND OBJECTIVE BOX
Patient is a 71y old  Female who presents with a chief complaint of B/L leg edema , difficulty ambulation , spouse can't take care at home (28 Jul 2022 14:46)      INTERVAL HPI/OVERNIGHT EVENTS: seen and examined , denies any c/o , still leg edema , decreased since admission   T(C): 36.3 (07-29-22 @ 01:05), Max: 36.9 (07-28-22 @ 16:14)  HR: 80 (07-29-22 @ 01:05) (76 - 85)  BP: 134/68 (07-29-22 @ 01:05) (103/60 - 136/66)  RR: 18 (07-29-22 @ 01:05) (18 - 18)  SpO2: 93% (07-29-22 @ 01:05) (93% - 94%)  Wt(kg): --  I&O's Summary    27 Jul 2022 07:01  -  28 Jul 2022 07:00  --------------------------------------------------------  IN: 440 mL / OUT: 2250 mL / NET: -1810 mL    28 Jul 2022 07:01  -  29 Jul 2022 02:13  --------------------------------------------------------  IN: 520 mL / OUT: 1200 mL / NET: -680 mL        PAST MEDICAL & SURGICAL HISTORY:  Osteoarthritis of both knees      HTN (hypertension)      History of morbid obesity      HCC (hepatocellular carcinoma)  Stage III      H/O ETOH abuse      ETOH abuse      Anxiety and depression      Liver cirrhosis      Chronic lower back pain      History of subdural hematoma  s/p fall 12/06/2015 - stable, no intervention      Seasonal allergies      Morbid obesity  BMI 40.5      Portal 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 No nausea, vomiting, or hematemesis; No diarrhea or constipation. No melena or hematochezia.  GENITOURINARY: No dysuria, frequency, hematuria, or incontinence  NEUROLOGICAL: No headaches, memory loss, loss of strength, numbness, or tremors  SKIN: No itching, burning, rashes, or lesions   LYMPH NODES: No enlarged glands  ENDOCRINE: No heat or cold intolerance; No hair loss  MUSCULOSKELETAL: No joint pain or swelling; No muscle, back, or extremity pain  PSYCHIATRIC: No depression, anxiety, mood swings, or difficulty sleeping  HEME/LYMPH: No easy bruising, or bleeding gums  ALLERY AND IMMUNOLOGIC: No hives or eczema    RADIOLOGY & ADDITIONAL TESTS:    Imaging Personally Reviewed:  [ ] YES  [ ] NO    Consultant(s) Notes Reviewed:  [ ] YES  [ ] NO    PHYSICAL EXAM:  GENERAL: NAD, well-groomed, well-developed  HEAD:  Atraumatic, Normocephalic  EYES: EOMI, PERRLA, conjunctiva and sclera clear  ENMT: No tonsillar erythema, exudates, or enlargement; Moist mucous membranes, Good dentition, No lesions  NECK: Supple, No JVD, Normal thyroid  NERVOUS SYSTEM:  Alert & Oriented X3, Good concentration; Motor Strength 5/5 B/L upper and lower extremities; DTRs 2+ intact and symmetric  CHEST/LUNG: Clear to percussion bilaterally; No rales, rhonchi, wheezing, or rubs  HEART: Regular rate and rhythm; No murmurs, rubs, or gallops  ABDOMEN: Soft, Nontender, Nondistended; Bowel sounds present  EXTREMITIES:  2+ Peripheral Pulses, No clubbing, cyanosis, or edema  LYMPH: No lymphadenopathy noted  SKIN: No rashes or lesions    LABS:                        13.3   7.20  )-----------( 129      ( 28 Jul 2022 07:03 )             39.3     07-28    136  |  96  |  15  ----------------------------<  90  5.2   |  32<H>  |  0.57    Ca    8.0<L>      28 Jul 2022 07:02  Mg     2.0     07-28          CAPILLARY BLOOD GLUCOSE                MEDICATIONS  (STANDING):  buPROPion XL (24-Hour) . 150 milliGRAM(s) Oral daily  folic acid 1 milliGRAM(s) Oral daily  heparin   Injectable 5000 Unit(s) SubCutaneous every 12 hours  pantoprazole    Tablet 40 milliGRAM(s) Oral before breakfast  traZODone 100 milliGRAM(s) Oral at bedtime    MEDICATIONS  (PRN):      Care Discussed with Consultants/Other Providers [ ] YES  [ ] NO

## 2022-07-28 NOTE — DISCHARGE NOTE PROVIDER - HOSPITAL COURSE
1. B/L leg edema : 2/2 HCC and less likely 2/2 heart failure as no associated SOB  - proBNP 207  - CXR with normal heart size, no pneumothorax, pleural effusion or consolidation  - check venous doppler - negative for DVT/ TTE shows EF >75%, hyperdynamic LV systolic function, normal RV  - IV lasix 40 mg IV bid d/c'd   - LE edema improving    2. Hx of Hepatocellular carcinoma ( HCC )   - pt. being followed [facility]  - on active chemo therapy , last dose was few days ago     3. Unsteady gait / unable to ambulate :  - will need PT eval and likely STR              B/L leg edema : 2/2 HCC and less likely 2/2 heart failure as no associated SOB  - proBNP 207  - CXR with normal heart size, no pneumothorax, pleural effusion or consolidation  - check venous doppler - negative for DVT/ TTE shows EF >75%, hyperdynamic LV systolic function, normal RV  - IV lasix 40 mg IV bid d/c'd ---now on oral lasix   - LE edema improvin 1. B/L leg edema : 2/2 HCC and less likely 2/2 heart failure as no associated SOB  - proBNP 207  - CXR with normal heart size, no pneumothorax, pleural effusion or consolidation  - check venous doppler - negative for DVT/ TTE shows EF >75%, hyperdynamic LV systolic function, normal RV  - IV lasix 40 mg IV bid d/c'd   - LE edema improving    2. Hx of Hepatocellular carcinoma ( HCC )   - pt. being followed with [facility]  - on active chemo therapy , last dose was few days ago     3. Unsteady gait / unable to ambulate :  - will need PT eval and likely STR     Discharge to [facility] 1. B/L leg edema 2/2 HCC and less likely 2/2 heart failure as no associated SOB  - proBNP 207  - CXR with normal heart size, no pneumothorax, pleural effusion or consolidation  - check venous doppler - negative for DVT/ TTE shows EF >75%, hyperdynamic LV systolic function, normal RV  - IV lasix 40 mg IV bid transitioned to 40mg PO daily  - LE edema improving    2. Hx of Hepatocellular carcinoma ( HCC )   - pt. being followed with [facility]  - on active chemo therapy , last dose was few days ago     3. Unsteady gait / unable to ambulate :  - will need PT eval and likely STR     Discharge to [facility] [place]

## 2022-07-28 NOTE — DISCHARGE NOTE PROVIDER - NSDCFUSCHEDAPPT_GEN_ALL_CORE_FT
[facility]  [facility]  Scheduled Appointment: 08/15/2022    [facility]  [facility]  Scheduled Appointment: 08/15/2022    [facility]  [facility]  Scheduled Appointment: 09/12/2022    [facility]  [facility]  Scheduled Appointment: 10/10/2022

## 2022-07-28 NOTE — PROGRESS NOTE ADULT - SUBJECTIVE AND OBJECTIVE BOX
Date of Service   07-28-22 @ 11:08    Patient is a 71y old  Female who presents with a chief complaint of B/L leg edema , difficulty ambulation , spouse can't take care at home (27 Jul 2022 18:52)      INTERVAL HISTORY: pt feels ok     REVIEW OF SYSTEMS:   CONSTITUTIONAL: No weakness  EYES/ENT: No visual changes; No throat pain  Neck: No pain or stiffness  Respiratory: No cough, wheezing, No shortness of breath  CARDIOVASCULAR: no chest pain or palpitations  GASTROINTESTINAL: No abdominal pain, no nausea, vomiting or hematemesis  GENITOURINARY: No dysuria, frequency or hematuria  NEUROLOGICAL: No stroke like symptoms  SKIN: No rashes    	  MEDICATIONS:        PHYSICAL EXAM:  T(C): 36.8 (07-28-22 @ 09:47), Max: 36.9 (07-27-22 @ 16:34)  HR: 81 (07-28-22 @ 09:47) (78 - 85)  BP: 128/59 (07-28-22 @ 09:47) (125/63 - 147/64)  RR: 18 (07-28-22 @ 09:47) (18 - 18)  SpO2: 94% (07-28-22 @ 09:47) (92% - 94%)  Wt(kg): --  I&O's Summary    27 Jul 2022 07:01  -  28 Jul 2022 07:00  --------------------------------------------------------  IN: 440 mL / OUT: 2250 mL / NET: -1810 mL    28 Jul 2022 07:01  -  28 Jul 2022 11:08  --------------------------------------------------------  IN: 280 mL / OUT: 0 mL / NET: 280 mL          Appearance: In no distress	  HEENT:    PERRL, EOMI	  Cardiovascular:  S1 S2, No JVD  Respiratory: Lungs clear to auscultation	  Gastrointestinal:  Soft, Non-tender, + BS	  Vasculature:  No edema of LE  Psychiatric: Appropriate affect   Neuro: no acute focal deficits                               13.3   7.20  )-----------( 129      ( 28 Jul 2022 07:03 )             39.3     07-28    136  |  96  |  15  ----------------------------<  90  5.2   |  32<H>  |  0.57    Ca    8.0<L>      28 Jul 2022 07:02  Mg     2.0     07-28          Labs personally reviewed      ASSESSMENT/PLAN: 	  1. B/L leg edema : 2/2 HCC and less likely 2/2 heart failure as no associated SOB  - proBNP 207  - CXR with normal heart size, no pneumothorax, pleural effusion or consolidation  - check venous doppler - negative for DVT/ TTE shows EF >75%, hyperdynamic LV systolic function, normal RV  - IV lasix 40 mg IV bid d/c'd   - LE edema improving    2. Hx of Hepatocellular carcinoma ( HCC )   - pt. being followed [facility]  - on active chemo therapy , last dose was few days ago     3. Unsteady gait / unable to ambulate :  - will need PT eval and likely STR       Diamante Mendoza FN-BC   Geoffrey Batista DO [facility]  Cardiovascular Medicine  [address], [address]  Office: [phone]

## 2022-07-28 NOTE — DISCHARGE NOTE PROVIDER - ATTENDING ATTESTATION STATEMENT
Unable to assess I have personally seen and examined the patient. I have collaborated with and supervised the

## 2022-07-28 NOTE — DISCHARGE NOTE PROVIDER - NSDCCPCAREPLAN_GEN_ALL_CORE_FT
PRINCIPAL DISCHARGE DIAGNOSIS  Diagnosis: Lower extremity edema  Assessment and Plan of Treatment: Improved  --B/L leg edema : 2/2 HCC and less likely 2/2 heart failure as no associated SOB----  - proBNP 207  - CXR with normal heart size, no pneumothorax, pleural effusion or consolidation  - check venous doppler - negative for DVT/ TTE shows EF >75%, hyperdynamic LV systolic function, normal RV  - S/P IV lasix 40 mg IV bid---contiue oral lasix as prescribed.      SECONDARY DISCHARGE DIAGNOSES  Diagnosis: Gait disturbance  Assessment and Plan of Treatment:     Diagnosis: HCC (hepatocellular carcinoma)  Assessment and Plan of Treatment: Follow-up with Dr. Brcok and Dr. Callejas for further monitoring and treatment    Dangelo Kruger (MD)  Gastroenterology; Internal Medicine; Transplant Hepatology  [address] [place] [address]  Phone: [phone]  Fax: [phone]       PRINCIPAL DISCHARGE DIAGNOSIS  Diagnosis: Lower extremity edema  Assessment and Plan of Treatment: Improved  --B/L leg edema : 2/2 HCC and less likely 2/2 heart failure as no associated SOB----  - proBNP 207  - CXR with normal heart size, no pneumothorax, pleural effusion or consolidation  - check venous doppler - negative for DVT/ TTE shows EF >75%, hyperdynamic LV systolic function, normal RV  - IV lasix 40 mg was given in the hospital---contiue oral lasix as prescribed.      SECONDARY DISCHARGE DIAGNOSES  Diagnosis: Gait disturbance  Assessment and Plan of Treatment: HOME CARE INSTRUCTIONS  Have someone stay with you until you feel stable.  Do not drive, operate machinery, or play sports until your caregiver says it is okay.  Keep all follow-up appointments as directed by your caregiver.   Lie down right away if you start feeling like you might faint. Breathe deeply and steadily. Wait until all the symptoms have passed.Drink enough fluids to keep your urine clear or pale yellow.  If you are taking blood pressure or heart medicine, get up slowly, taking several minutes to sit and then stand. This can reduce dizziness.  SEEK IMMEDIATE MEDICAL CARE IF:  You have a severe headache.  You have unusual pain in the chest, abdomen, or back.  You are bleeding from the mouth or rectum, or you have black or tarry stool.  You have an irregular or very fast heartbeat.  You have pain with breathing.  You have repeated fainting or seizure-like jerking during an episode.  You faint when sitting or lying down.  You have confusion.  You have difficulty walking.  You have severe weakness.  You have vision problems.  Followup with PMD after Rehab       Diagnosis: HCC (hepatocellular carcinoma)  Assessment and Plan of Treatment: Follow-up with Dr. Brock and Dr. Callejas for further monitoring and treatment    Dangelo Kruger)  Gastroenterology; Internal Medicine; Transplant Hepatology  [address] [place] [id number]  Phone: [phone]  Fax: [phone]

## 2022-07-29 NOTE — PROGRESS NOTE ADULT - SUBJECTIVE AND OBJECTIVE BOX
Date of Service   07-29-22 @ 09:32    Patient is a 71y old  Female who presents with a chief complaint of B/L leg edema , difficulty ambulation , spouse can't take care at home (28 Jul 2022 19:13)      INTERVAL HISTORY: pt feels ok     REVIEW OF SYSTEMS:   CONSTITUTIONAL: No weakness  EYES/ENT: No visual changes; No throat pain  Neck: No pain or stiffness  Respiratory: No cough, wheezing, No shortness of breath  CARDIOVASCULAR: no chest pain or palpitations  GASTROINTESTINAL: No abdominal pain, no nausea, vomiting or hematemesis  GENITOURINARY: No dysuria, frequency or hematuria  NEUROLOGICAL: No stroke like symptoms  SKIN: No rashes    	  MEDICATIONS:        PHYSICAL EXAM:  T(C): 36.7 (07-29-22 @ 05:03), Max: 36.9 (07-28-22 @ 16:14)  HR: 82 (07-29-22 @ 05:03) (76 - 85)  BP: 146/67 (07-29-22 @ 05:03) (103/60 - 146/67)  RR: 18 (07-29-22 @ 05:03) (18 - 18)  SpO2: 95% (07-29-22 @ 05:03) (93% - 95%)  Wt(kg): --  I&O's Summary    28 Jul 2022 07:01  -  29 Jul 2022 07:00  --------------------------------------------------------  IN: 520 mL / OUT: 1400 mL / NET: -880 mL          Appearance: In no distress	  HEENT:    PERRL, EOMI	  Cardiovascular:  S1 S2, No JVD  Respiratory: Lungs clear to auscultation	  Gastrointestinal:  Soft, Non-tender, + BS	  Vasculature:  +2 edema of LE  Psychiatric: Appropriate affect   Neuro: no acute focal deficits                               13.3   7.20  )-----------( 129      ( 28 Jul 2022 07:03 )             39.3     07-29    137  |  x   |  x   ----------------------------<  x   x    |  x   |  0.60    Ca    8.0<L>      28 Jul 2022 07:02  Mg     2.0     07-28    TPro  x   /  Alb  x   /  TBili  3.7<H>  /  DBili  x   /  AST  x   /  ALT  x   /  AlkPhos  x   07-29        Labs personally reviewed      ASSESSMENT/PLAN: 	    1. B/L leg edema : 2/2 HCC and less likely 2/2 heart failure as no associated SOB  - proBNP 207  - CXR with normal heart size, no pneumothorax, pleural effusion or consolidation  - check venous doppler - negative for DVT/ TTE shows EF >75%, hyperdynamic LV systolic function, normal RV  - IV lasix 40 mg IV bid d/c'd by [facility] 7/28   - LE edema mildly improving    2. Hx of Hepatocellular carcinoma ( HCC )   - [facility]. being [facility]  - on active chemo therapy , last dose was few days ago     3. Unsteady gait / unable to ambulate :  - will need PT eval and likely STR           Diamante Mendoza [facility]-BC   Geoffrey Batista DO [facility]  Cardiovascular Medicine  [address] [place] [address], [address]  Office: [phone]

## 2022-07-29 NOTE — CHART NOTE - NSCHARTNOTEFT_GEN_A_CORE
72 y/o female with hx HCC--d/w DR. Brock -Oncologist pt is due for immunotherapy 8/15/22, however if pt goes to rehab she will not need immunotherapy treatement while in [facility].

## 2022-07-29 NOTE — PROGRESS NOTE ADULT - SUBJECTIVE AND OBJECTIVE BOX
Patient is a 71y old  Female who presents with a chief complaint of B/L leg edema , difficulty ambulation , spouse can't take care at home (29 Jul 2022 09:32)      INTERVAL HPI/OVERNIGHT EVENTS:  T(C): 36.9 (07-29-22 @ 21:25), Max: 36.9 (07-29-22 @ 17:06)  HR: 76 (07-29-22 @ 21:25) (76 - 87)  BP: 135/62 (07-29-22 @ 21:25) (133/56 - 146/67)  RR: 18 (07-29-22 @ 21:25) (18 - 18)  SpO2: 93% (07-29-22 @ 21:25) (93% - 95%)  Wt(kg): --  I&O's Summary    28 Jul 2022 07:01  -  29 Jul 2022 07:00  --------------------------------------------------------  IN: 520 mL / OUT: 1400 mL / NET: -880 mL    29 Jul 2022 07:01  -  29 Jul 2022 23:21  --------------------------------------------------------  IN: 480 mL / OUT: 550 mL / NET: -70 mL        PAST MEDICAL & SURGICAL HISTORY:  Osteoarthritis of both knees      HTN (hypertension)      History of morbid obesity      HCC (hepatocellular carcinoma)  Stage III      H/O ETOH abuse      ETOH abuse      Anxiety and depression      Liver cirrhosis      Chronic lower back pain      History of subdural hematoma  s/p fall 12/06/2015 - stable, no intervention      Seasonal allergies      Morbid obesity  BMI 40.5      Portal 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 No nausea, vomiting, or hematemesis; No diarrhea or constipation. No melena or hematochezia.  GENITOURINARY: No dysuria, frequency, hematuria, or incontinence  NEUROLOGICAL: No headaches, memory loss, loss of strength, numbness, or tremors  SKIN: No itching, burning, rashes, or lesions   LYMPH NODES: No enlarged glands  ENDOCRINE: No heat or cold intolerance; No hair loss  MUSCULOSKELETAL: No joint pain or swelling; No muscle, back, or extremity pain  PSYCHIATRIC: No depression, anxiety, mood swings, or difficulty sleeping  HEME/LYMPH: No easy bruising, or bleeding gums  ALLERY AND IMMUNOLOGIC: No hives or eczema    RADIOLOGY & ADDITIONAL TESTS:    Imaging Personally Reviewed:  [ ] YES  [ ] NO    Consultant(s) Notes Reviewed:  [ ] YES  [ ] NO    PHYSICAL EXAM:  GENERAL: NAD, well-groomed, well-developed  HEAD:  Atraumatic, Normocephalic  EYES: EOMI, PERRLA, conjunctiva and sclera clear  ENMT: No tonsillar erythema, exudates, or enlargement; Moist mucous membranes, Good dentition, No lesions  NECK: Supple, No JVD, Normal thyroid  NERVOUS SYSTEM:  Alert & Oriented X3, Good concentration; Motor Strength 5/5 B/L upper and lower extremities; DTRs 2+ intact and symmetric  CHEST/LUNG: Clear to percussion bilaterally; No rales, rhonchi, wheezing, or rubs  HEART: Regular rate and rhythm; No murmurs, rubs, or gallops  ABDOMEN: Soft, Nontender, Nondistended; Bowel sounds present  EXTREMITIES:  2+ Peripheral Pulses, No clubbing, cyanosis, or edema  LYMPH: No lymphadenopathy noted  SKIN: No rashes or lesions    LABS:                        13.3   7.20  )-----------( 129      ( 28 Jul 2022 07:03 )             39.3     07-29    137  |  x   |  x   ----------------------------<  x   x    |  x   |  0.60    Ca    8.0<L>      28 Jul 2022 07:02  Mg     2.0     07-28    TPro  x   /  Alb  x   /  TBili  3.7<H>  /  DBili  x   /  AST  x   /  ALT  x   /  AlkPhos  x   07-29    PT/INR - ( 29 Jul 2022 06:58 )   PT: 15.3 sec;   INR: 1.32 ratio             CAPILLARY BLOOD GLUCOSE                MEDICATIONS  (STANDING):  buPROPion XL (24-Hour) . 150 milliGRAM(s) Oral daily  folic acid 1 milliGRAM(s) Oral daily  furosemide    Tablet 40 milliGRAM(s) Oral daily  heparin   Injectable 5000 Unit(s) SubCutaneous every 12 hours  pantoprazole    Tablet 40 milliGRAM(s) Oral before breakfast  traZODone 100 milliGRAM(s) Oral at bedtime    MEDICATIONS  (PRN):      Care Discussed with Consultants/Other Providers [ ] YES  [ ] NO Patient is a 71y old  Female who presents with a chief complaint of B/L leg edema , difficulty ambulation , spouse can't take care at home (29 Jul 2022 09:32)      INTERVAL HPI/OVERNIGHT EVENTS: seen and examined   T(C): 36.9 (07-29-22 @ 21:25), Max: 36.9 (07-29-22 @ 17:06)  HR: 76 (07-29-22 @ 21:25) (76 - 87)  BP: 135/62 (07-29-22 @ 21:25) (133/56 - 146/67)  RR: 18 (07-29-22 @ 21:25) (18 - 18)  SpO2: 93% (07-29-22 @ 21:25) (93% - 95%)  Wt(kg): --  I&O's Summary    28 Jul 2022 07:01  -  29 Jul 2022 07:00  --------------------------------------------------------  IN: 520 mL / OUT: 1400 mL / NET: -880 mL    29 Jul 2022 07:01  -  29 Jul 2022 23:21  --------------------------------------------------------  IN: 480 mL / OUT: 550 mL / NET: -70 mL        PAST MEDICAL & SURGICAL HISTORY:  Osteoarthritis of both knees      HTN (hypertension)      History of morbid obesity      HCC (hepatocellular carcinoma)  Stage III      H/O ETOH abuse      ETOH abuse      Anxiety and depression      Liver cirrhosis      Chronic lower back pain      History of subdural hematoma  s/p fall 12/06/2015 - stable, no intervention      Seasonal allergies      Morbid obesity  BMI 40.5      Portal 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 No nausea, vomiting, or hematemesis; No diarrhea or constipation. No melena or hematochezia.  GENITOURINARY: No dysuria, frequency, hematuria, or incontinence  NEUROLOGICAL: No headaches, memory loss, loss of strength, numbness, or tremors  SKIN: No itching, burning, rashes, or lesions   LYMPH NODES: No enlarged glands  ENDOCRINE: No heat or cold intolerance; No hair loss  MUSCULOSKELETAL: No joint pain or swelling; No muscle, back, or extremity pain  PSYCHIATRIC: No depression, anxiety, mood swings, or difficulty sleeping  HEME/LYMPH: No easy bruising, or bleeding gums  ALLERY AND IMMUNOLOGIC: No hives or eczema    RADIOLOGY & ADDITIONAL TESTS:    Imaging Personally Reviewed:  [ ] YES  [ ] NO    Consultant(s) Notes Reviewed:  [ ] YES  [ ] NO    PHYSICAL EXAM:  GENERAL: NAD, well-groomed, well-developed  HEAD:  Atraumatic, Normocephalic  EYES: EOMI, PERRLA, conjunctiva and sclera clear  ENMT: No tonsillar erythema, exudates, or enlargement; Moist mucous membranes, Good dentition, No lesions  NECK: Supple, No JVD, Normal thyroid  NERVOUS SYSTEM:  Alert & Oriented X3, Good concentration; Motor Strength 5/5 B/L upper and lower extremities; DTRs 2+ intact and symmetric  CHEST/LUNG: Clear to percussion bilaterally; No rales, rhonchi, wheezing, or rubs  HEART: Regular rate and rhythm; No murmurs, rubs, or gallops  ABDOMEN: Soft, Nontender, Nondistended; Bowel sounds present  EXTREMITIES:  2+ Peripheral Pulses, No clubbing, cyanosis, or edema  LYMPH: No lymphadenopathy noted  SKIN: No rashes or lesions    LABS:                        13.3   7.20  )-----------( 129      ( 28 Jul 2022 07:03 )             39.3     07-29    137  |  x   |  x   ----------------------------<  x   x    |  x   |  0.60    Ca    8.0<L>      28 Jul 2022 07:02  Mg     2.0     07-28    TPro  x   /  Alb  x   /  TBili  3.7<H>  /  DBili  x   /  AST  x   /  ALT  x   /  AlkPhos  x   07-29    PT/INR - ( 29 Jul 2022 06:58 )   PT: 15.3 sec;   INR: 1.32 ratio             CAPILLARY BLOOD GLUCOSE                MEDICATIONS  (STANDING):  buPROPion XL (24-Hour) . 150 milliGRAM(s) Oral daily  folic acid 1 milliGRAM(s) Oral daily  furosemide    Tablet 40 milliGRAM(s) Oral daily  heparin   Injectable 5000 Unit(s) SubCutaneous every 12 hours  pantoprazole    Tablet 40 milliGRAM(s) Oral before breakfast  traZODone 100 milliGRAM(s) Oral at bedtime    MEDICATIONS  (PRN):      Care Discussed with Consultants/Other Providers [ ] YES  [ ] NO

## 2022-07-30 NOTE — PROGRESS NOTE ADULT - SUBJECTIVE AND OBJECTIVE BOX
DATE OF SERVICE: 07-30-22 @ 12:40    Patient is a 71y old  Female who presents with a chief complaint of B/L leg edema , difficulty ambulation , spouse can't take care at home (29 Jul 2022 18:21)      INTERVAL HISTORY: Feels ok.     REVIEW OF SYSTEMS:  CONSTITUTIONAL: No weakness  EYES/ENT: No visual changes;  No throat pain   NECK: No pain or stiffness  RESPIRATORY: No cough, wheezing; No shortness of breath  CARDIOVASCULAR: No chest pain or palpitations  GASTROINTESTINAL: No abdominal  pain. No nausea, vomiting, or hematemesis  GENITOURINARY: No dysuria, frequency or hematuria  NEUROLOGICAL: No stroke like symptoms  SKIN: No rashes    	  MEDICATIONS:  furosemide    Tablet 40 milliGRAM(s) Oral daily        PHYSICAL EXAM:  T(C): 37 (07-30-22 @ 10:50), Max: 37 (07-30-22 @ 01:05)  HR: 82 (07-30-22 @ 10:50) (76 - 87)  BP: 147/62 (07-30-22 @ 10:50) (127/70 - 150/56)  RR: 18 (07-30-22 @ 10:50) (18 - 18)  SpO2: 93% (07-30-22 @ 10:50) (93% - 94%)  Wt(kg): --  I&O's Summary    29 Jul 2022 07:01  -  30 Jul 2022 07:00  --------------------------------------------------------  IN: 880 mL / OUT: 950 mL / NET: -70 mL    30 Jul 2022 07:01  -  30 Jul 2022 12:40  --------------------------------------------------------  IN: 340 mL / OUT: 600 mL / NET: -260 mL          Appearance: In no distress	  HEENT:    PERRL, EOMI	  Cardiovascular:  S1 S2, No JVD  Respiratory: Lungs clear to auscultation	  Gastrointestinal:  Soft, Non-tender, + BS	  Vascularature:  dependent edema   Psychiatric: Appropriate affect   Neuro: no acute focal deficits           07-29    137  |  x   |  x   ----------------------------<  x   x    |  x   |  0.60      TPro  x   /  Alb  x   /  TBili  3.7<H>  /  DBili  x   /  AST  x   /  ALT  x   /  AlkPhos  x   07-29        Labs personally reviewed      ASSESSMENT/PLAN: 	        1. B/L leg edema : 2/2 HCC and less likely 2/2 heart failure as no associated SOB  - proBNP 207  - CXR with normal heart size, no pneumothorax, pleural effusion or consolidation  - check venous doppler - negative for DVT/ TTE shows EF >75%, hyperdynamic LV systolic function, normal RV  - IV lasix 40 mg IV bid d/c'd by [facility] 7/28   - LE edema mildly improving    2. Hx of Hepatocellular carcinoma ( HCC )   - pt. being followed with [facility]  - on active chemo therapy , last dose was few days ago     3. Unsteady gait / unable to ambulate :  - will need PT eval and likely STR   - Plan for [facility] likely tomorrow      ISABELA Caldwell-NP   Geoffrey Batista DO [facility]  Cardiovascular Medicine  [address], [address]  Office: [phone]  Cell: [phone]

## 2022-07-30 NOTE — PROGRESS NOTE ADULT - SUBJECTIVE AND OBJECTIVE BOX
Patient is a 71y old  Female who presents with a chief complaint of B/L leg edema , difficulty ambulation , spouse can't take care at home (30 Jul 2022 12:40)      INTERVAL HPI/OVERNIGHT EVENTS: seen and examined   T(C): 36.8 (07-31-22 @ 00:20), Max: 37.2 (07-30-22 @ 14:44)  HR: 80 (07-31-22 @ 00:20) (76 - 88)  BP: 159/62 (07-31-22 @ 00:20) (116/73 - 159/62)  RR: 18 (07-31-22 @ 00:20) (18 - 18)  SpO2: 92% (07-31-22 @ 00:20) (92% - 93%)  Wt(kg): --  I&O's Summary    29 Jul 2022 07:01  -  30 Jul 2022 07:00  --------------------------------------------------------  IN: 880 mL / OUT: 950 mL / NET: -70 mL    30 Jul 2022 07:01  -  31 Jul 2022 02:05  --------------------------------------------------------  IN: 1040 mL / OUT: 1250 mL / NET: -210 mL        PAST MEDICAL & SURGICAL HISTORY:  Osteoarthritis of both knees      HTN (hypertension)      History of morbid obesity      HCC (hepatocellular carcinoma)  Stage III      H/O ETOH abuse      ETOH abuse      Anxiety and depression      Liver cirrhosis      Chronic lower back pain      History of subdural hematoma  s/p fall 12/06/2015 - stable, no intervention      Seasonal allergies      Morbid obesity  BMI 40.5      Portal 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 No nausea, vomiting, or hematemesis; No diarrhea or constipation. No melena or hematochezia.  GENITOURINARY: No dysuria, frequency, hematuria, or incontinence  NEUROLOGICAL: No headaches, memory loss, loss of strength, numbness, or tremors  SKIN: No itching, burning, rashes, or lesions   LYMPH NODES: No enlarged glands  ENDOCRINE: No heat or cold intolerance; No hair loss  MUSCULOSKELETAL: No joint pain or swelling; No muscle, back, or extremity pain  PSYCHIATRIC: No depression, anxiety, mood swings, or difficulty sleeping  HEME/LYMPH: No easy bruising, or bleeding gums  ALLERY AND IMMUNOLOGIC: No hives or eczema    RADIOLOGY & ADDITIONAL TESTS:    Imaging Personally Reviewed:  [ ] YES  [ ] NO    Consultant(s) Notes Reviewed:  [ ] YES  [ ] NO    PHYSICAL EXAM:  GENERAL: NAD, well-groomed, well-developed  HEAD:  Atraumatic, Normocephalic  EYES: EOMI, PERRLA, conjunctiva and sclera clear  ENMT: No tonsillar erythema, exudates, or enlargement; Moist mucous membranes, Good dentition, No lesions  NECK: Supple, No JVD, Normal thyroid  NERVOUS SYSTEM:  Alert & Oriented X3, Good concentration; Motor Strength 5/5 B/L upper and lower extremities; DTRs 2+ intact and symmetric  CHEST/LUNG: Clear to percussion bilaterally; No rales, rhonchi, wheezing, or rubs  HEART: Regular rate and rhythm; No murmurs, rubs, or gallops  ABDOMEN: Soft, Nontender, Nondistended; Bowel sounds present  EXTREMITIES:  2+ Peripheral Pulses, No clubbing, cyanosis, or edema  LYMPH: No lymphadenopathy noted  SKIN: No rashes or lesions    LABS:    07-29    137  |  x   |  x   ----------------------------<  x   x    |  x   |  0.60      TPro  x   /  Alb  x   /  TBili  3.7<H>  /  DBili  x   /  AST  x   /  ALT  x   /  AlkPhos  x   07-29    PT/INR - ( 29 Jul 2022 06:58 )   PT: 15.3 sec;   INR: 1.32 ratio             CAPILLARY BLOOD GLUCOSE                MEDICATIONS  (STANDING):  buPROPion XL (24-Hour) . 150 milliGRAM(s) Oral daily  folic acid 1 milliGRAM(s) Oral daily  furosemide    Tablet 40 milliGRAM(s) Oral daily  heparin   Injectable 5000 Unit(s) SubCutaneous every 12 hours  pantoprazole    Tablet 40 milliGRAM(s) Oral before breakfast  traZODone 100 milliGRAM(s) Oral at bedtime    MEDICATIONS  (PRN):      Care Discussed with Consultants/Other Providers [ ] YES  [ ] NO

## 2022-07-31 NOTE — PROGRESS NOTE ADULT - SUBJECTIVE AND OBJECTIVE BOX
Patient is a 71y old  Female who presents with a chief complaint of B/L leg edema , difficulty ambulation , spouse can't take care at home (31 Jul 2022 11:56)      INTERVAL HPI/OVERNIGHT EVENTS:  T(C): 36.7 (07-31-22 @ 20:07), Max: 37.1 (07-31-22 @ 09:44)  HR: 81 (07-31-22 @ 20:07) (78 - 84)  BP: 144/67 (07-31-22 @ 20:07) (115/66 - 159/62)  RR: 18 (07-31-22 @ 20:07) (16 - 18)  SpO2: 100% (07-31-22 @ 20:07) (92% - 100%)  Wt(kg): --  I&O's Summary    30 Jul 2022 07:01  -  31 Jul 2022 07:00  --------------------------------------------------------  IN: 1040 mL / OUT: 1700 mL / NET: -660 mL    31 Jul 2022 07:01  -  31 Jul 2022 21:52  --------------------------------------------------------  IN: 1360 mL / OUT: 1350 mL / NET: 10 mL        PAST MEDICAL & SURGICAL HISTORY:  Osteoarthritis of both knees      HTN (hypertension)      History of morbid obesity      HCC (hepatocellular carcinoma)  Stage III      H/O ETOH abuse      ETOH abuse      Anxiety and depression      Liver cirrhosis      Chronic lower back pain      History of subdural hematoma  s/p fall 12/06/2015 - stable, no intervention      Seasonal allergies      Morbid obesity  BMI 40.5      Portal 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 No nausea, vomiting, or hematemesis; No diarrhea or constipation. No melena or hematochezia.  GENITOURINARY: No dysuria, frequency, hematuria, or incontinence  NEUROLOGICAL: No headaches, memory loss, loss of strength, numbness, or tremors  SKIN: No itching, burning, rashes, or lesions   LYMPH NODES: No enlarged glands  ENDOCRINE: No heat or cold intolerance; No hair loss  MUSCULOSKELETAL: No joint pain or swelling; No muscle, back, or extremity pain  PSYCHIATRIC: No depression, anxiety, mood swings, or difficulty sleeping  HEME/LYMPH: No easy bruising, or bleeding gums  ALLERY AND IMMUNOLOGIC: No hives or eczema    RADIOLOGY & ADDITIONAL TESTS:    Imaging Personally Reviewed:  [ ] YES  [ ] NO    Consultant(s) Notes Reviewed:  [ ] YES  [ ] NO    PHYSICAL EXAM:  GENERAL: NAD, well-groomed, well-developed  HEAD:  Atraumatic, Normocephalic  EYES: EOMI, PERRLA, conjunctiva and sclera clear  ENMT: No tonsillar erythema, exudates, or enlargement; Moist mucous membranes, Good dentition, No lesions  NECK: Supple, No JVD, Normal thyroid  NERVOUS SYSTEM:  Alert & Oriented X3, Good concentration; Motor Strength 5/5 B/L upper and lower extremities; DTRs 2+ intact and symmetric  CHEST/LUNG: Clear to percussion bilaterally; No rales, rhonchi, wheezing, or rubs  HEART: Regular rate and rhythm; No murmurs, rubs, or gallops  ABDOMEN: Soft, Nontender, Nondistended; Bowel sounds present  EXTREMITIES:  2+ Peripheral Pulses, No clubbing, cyanosis, or edema  LYMPH: No lymphadenopathy noted  SKIN: No rashes or lesions    LABS:                        13.7   7.69  )-----------( 119      ( 31 Jul 2022 09:30 )             40.0     07-31    137  |  93<L>  |  14  ----------------------------<  142<H>  2.8<LL>   |  35<H>  |  0.67    Ca    8.3<L>      31 Jul 2022 09:30    TPro  6.3  /  Alb  2.7<L>  /  TBili  4.4<H>  /  DBili  x   /  AST  118<H>  /  ALT  35  /  AlkPhos  175<H>  07-31    PT/INR - ( 31 Jul 2022 09:30 )   PT: 14.8 sec;   INR: 1.27 ratio         PTT - ( 31 Jul 2022 09:30 )  PTT:39.9 sec    CAPILLARY BLOOD GLUCOSE                MEDICATIONS  (STANDING):  buPROPion XL (24-Hour) . 150 milliGRAM(s) Oral daily  folic acid 1 milliGRAM(s) Oral daily  furosemide    Tablet 40 milliGRAM(s) Oral daily  heparin   Injectable 5000 Unit(s) SubCutaneous every 12 hours  pantoprazole    Tablet 40 milliGRAM(s) Oral before breakfast  potassium chloride    Tablet ER 10 milliEquivalent(s) Oral daily  traZODone 100 milliGRAM(s) Oral at bedtime    MEDICATIONS  (PRN):      Care Discussed with Consultants/Other Providers [ ] YES  [ ] NO no

## 2022-07-31 NOTE — PROGRESS NOTE ADULT - SUBJECTIVE AND OBJECTIVE BOX
DATE OF SERVICE: 07-31-22 @ 11:57    Patient is a 71y old  Female who presents with a chief complaint of B/L leg edema , difficulty ambulation , spouse can't take care at home (30 Jul 2022 19:04)      INTERVAL HISTORY: Feels ok.     REVIEW OF SYSTEMS:  CONSTITUTIONAL: No weakness  EYES/ENT: No visual changes;  No throat pain   NECK: No pain or stiffness  RESPIRATORY: No cough, wheezing; No shortness of breath  CARDIOVASCULAR: No chest pain or palpitations  GASTROINTESTINAL: No abdominal  pain. No nausea, vomiting, or hematemesis  GENITOURINARY: No dysuria, frequency or hematuria  NEUROLOGICAL: No stroke like symptoms  SKIN: No rashes    	  MEDICATIONS:  furosemide    Tablet 40 milliGRAM(s) Oral daily        PHYSICAL EXAM:  T(C): 37.1 (07-31-22 @ 09:44), Max: 37.2 (07-30-22 @ 14:44)  HR: 79 (07-31-22 @ 09:44) (78 - 88)  BP: 130/68 (07-31-22 @ 09:44) (116/73 - 159/62)  RR: 18 (07-31-22 @ 09:44) (18 - 18)  SpO2: 93% (07-31-22 @ 09:44) (92% - 93%)  Wt(kg): --  I&O's Summary    30 Jul 2022 07:01  -  31 Jul 2022 07:00  --------------------------------------------------------  IN: 1040 mL / OUT: 1700 mL / NET: -660 mL    31 Jul 2022 07:01  -  31 Jul 2022 11:57  --------------------------------------------------------  IN: 680 mL / OUT: 800 mL / NET: -120 mL          Appearance: In no distress	  HEENT:    PERRL, EOMI	  Cardiovascular:  S1 S2, No JVD  Respiratory: Lungs clear to auscultation	  Gastrointestinal:  Soft, Non-tender, + BS	  Vascularature: + 1 B/L  LE  Psychiatric: Appropriate affect   Neuro: no acute focal deficits                               13.7   7.69  )-----------( 119      ( 31 Jul 2022 09:30 )             40.0     07-31    137  |  93<L>  |  14  ----------------------------<  142<H>  2.8<LL>   |  35<H>  |  0.67    Ca    8.3<L>      31 Jul 2022 09:30    TPro  6.3  /  Alb  2.7<L>  /  TBili  4.4<H>  /  DBili  x   /  AST  118<H>  /  ALT  35  /  AlkPhos  175<H>  07-31        Labs personally reviewed      ASSESSMENT/PLAN: 	        1. B/L leg edema : 2/2 HCC and less likely 2/2 heart failure as no associated SOB  - proBNP 207  - CXR with normal heart size, no pneumothorax, pleural effusion or consolidation  - check venous doppler - negative for DVT/ TTE shows EF >75%, hyperdynamic LV systolic function, normal RV  - IV lasix 40 mg IV bid d/c'd by [facility] 7/28   - LE edema mildly improving  - c/w Lasix 40mg PO daily as outpatient    2. Hx of Hepatocellular carcinoma ( HCC )   - pt. being followed with [facility]  - on active chemo therapy , last dose was few days ago     3. Unsteady gait / unable to ambulate :  - will need PT eval and likely STR   - Plan for [facility] likely tomorrow        Belinda Ariza, ISABELA-MINAL Batista DO [facility]  Cardiovascular Medicine  [address] [place] [address], [address]  Office: [phone]  Cell: [phone]

## 2022-08-01 NOTE — PROGRESS NOTE ADULT - NS ATTEND AMEND GEN_ALL_CORE FT
Pt care and plan discussed and reviewed with NP. Plan as outlined above edited by me to reflect our discussion.

## 2022-08-01 NOTE — PROGRESS NOTE ADULT - NS ATTEND OPT1 GEN_ALL_CORE

## 2022-08-01 NOTE — PROGRESS NOTE ADULT - SUBJECTIVE AND OBJECTIVE BOX
DATE OF SERVICE: 08-01-22 @ 09:06    Patient is a 72y old  Female who presents with a chief complaint of B/L leg edema , difficulty ambulation , spouse can't take care at home (31 Jul 2022 20:52)      INTERVAL HISTORY: Feels good.     REVIEW OF SYSTEMS:  CONSTITUTIONAL: No weakness  EYES/ENT: No visual changes;  No throat pain   NECK: No pain or stiffness  RESPIRATORY: No cough, wheezing; No shortness of breath  CARDIOVASCULAR: No chest pain or palpitations  GASTROINTESTINAL: No abdominal  pain. No nausea, vomiting, or hematemesis  GENITOURINARY: No dysuria, frequency or hematuria  NEUROLOGICAL: No stroke like symptoms  SKIN: No rashes    	  MEDICATIONS:  furosemide    Tablet 40 milliGRAM(s) Oral daily        PHYSICAL EXAM:  T(C): 36.9 (08-01-22 @ 05:37), Max: 37.1 (07-31-22 @ 09:44)  HR: 76 (08-01-22 @ 05:37) (76 - 84)  BP: 110/74 (08-01-22 @ 05:37) (110/69 - 145/76)  RR: 18 (08-01-22 @ 05:37) (16 - 18)  SpO2: 100% (08-01-22 @ 05:37) (93% - 100%)  Wt(kg): --  I&O's Summary    31 Jul 2022 07:01  -  01 Aug 2022 07:00  --------------------------------------------------------  IN: 1360 mL / OUT: 1950 mL / NET: -590 mL    01 Aug 2022 07:01  -  01 Aug 2022 09:06  --------------------------------------------------------  IN: 240 mL / OUT: 200 mL / NET: 40 mL          Appearance: In no distress	  HEENT:    PERRL, EOMI	  Cardiovascular:  S1 S2, No JVD  Respiratory: Lungs clear to auscultation	  Gastrointestinal:  Soft, Non-tender, + BS	  Vascularature:  + edema of LE (chronic yet improved with Lasix)  Psychiatric: Appropriate affect   Neuro: no acute focal deficits                               13.7   7.69  )-----------( 119      ( 31 Jul 2022 09:30 )             40.0     07-31    137  |  93<L>  |  14  ----------------------------<  142<H>  2.8<LL>   |  35<H>  |  0.67    Ca    8.3<L>      31 Jul 2022 09:30    TPro  6.3  /  Alb  2.7<L>  /  TBili  4.4<H>  /  DBili  x   /  AST  118<H>  /  ALT  35  /  AlkPhos  175<H>  07-31        Labs personally reviewed      ASSESSMENT/PLAN: 	    1. B/L leg edema : 2/2 HCC and less likely 2/2 heart failure as no associated SOB  - proBNP 207  - CXR with normal heart size, no pneumothorax, pleural effusion or consolidation  - check venous doppler - negative for DVT/ TTE shows EF >75%, hyperdynamic LV systolic function, normal RV  - IV lasix 40 mg IV bid d/c'd by [facility] 7/28   - LE edema mildly improving  - c/w Lasix 40mg PO daily as outpatient    2. Hx of Hepatocellular carcinoma ( HCC )   - pt. being followed with [facility]  - on active chemo therapy , last dose was few days ago   - Plan for immunotherapy pending whether or not she will still be in [facility]    3. Unsteady gait / unable to ambulate :  - will need PT eval and likely STR   - Plan for [facility] likely tomorrow        Belinda Ariza, ISABELA-NP   Geoffrey Batista DO [facility]  Cardiovascular Medicine  [address], [address]  Office: [phone]  Cell: [phone]

## 2022-08-01 NOTE — PROGRESS NOTE ADULT - SUBJECTIVE AND OBJECTIVE BOX
Patient is a 72y old  Female who presents with a chief complaint of B/L leg edema , difficulty ambulation , spouse can't take care at home (01 Aug 2022 09:06)      INTERVAL HPI/OVERNIGHT EVENTS:  T(C): 36.9 (08-01-22 @ 20:46), Max: 37.2 (08-01-22 @ 10:24)  HR: 83 (08-01-22 @ 20:46) (76 - 83)  BP: 153/75 (08-01-22 @ 20:46) (110/69 - 153/75)  RR: 18 (08-01-22 @ 20:46) (17 - 18)  SpO2: 94% (08-01-22 @ 20:46) (93% - 100%)  Wt(kg): --  I&O's Summary    31 Jul 2022 07:01  -  01 Aug 2022 07:00  --------------------------------------------------------  IN: 1360 mL / OUT: 1950 mL / NET: -590 mL    01 Aug 2022 07:01  -  01 Aug 2022 23:26  --------------------------------------------------------  IN: 720 mL / OUT: 700 mL / NET: 20 mL        PAST MEDICAL & SURGICAL HISTORY:  Osteoarthritis of both knees      HTN (hypertension)      History of morbid obesity      HCC (hepatocellular carcinoma)  Stage III      H/O ETOH abuse      ETOH abuse      Anxiety and depression      Liver cirrhosis      Chronic lower back pain      History of subdural hematoma  s/p fall 12/06/2015 - stable, no intervention      Seasonal allergies      Morbid obesity  BMI 40.5      Portal 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 No nausea, vomiting, or hematemesis; No diarrhea or constipation. No melena or hematochezia.  GENITOURINARY: No dysuria, frequency, hematuria, or incontinence  NEUROLOGICAL: No headaches, memory loss, loss of strength, numbness, or tremors  SKIN: No itching, burning, rashes, or lesions   LYMPH NODES: No enlarged glands  ENDOCRINE: No heat or cold intolerance; No hair loss  MUSCULOSKELETAL: No joint pain or swelling; No muscle, back, or extremity pain  PSYCHIATRIC: No depression, anxiety, mood swings, or difficulty sleeping  HEME/LYMPH: No easy bruising, or bleeding gums  ALLERY AND IMMUNOLOGIC: No hives or eczema    RADIOLOGY & ADDITIONAL TESTS:    Imaging Personally Reviewed:  [ ] YES  [ ] NO    Consultant(s) Notes Reviewed:  [ ] YES  [ ] NO    PHYSICAL EXAM:  GENERAL: NAD, well-groomed, well-developed  HEAD:  Atraumatic, Normocephalic  EYES: EOMI, PERRLA, conjunctiva and sclera clear  ENMT: No tonsillar erythema, exudates, or enlargement; Moist mucous membranes, Good dentition, No lesions  NECK: Supple, No JVD, Normal thyroid  NERVOUS SYSTEM:  Alert & Oriented X3, Good concentration; Motor Strength 5/5 B/L upper and lower extremities; DTRs 2+ intact and symmetric  CHEST/LUNG: Clear to percussion bilaterally; No rales, rhonchi, wheezing, or rubs  HEART: Regular rate and rhythm; No murmurs, rubs, or gallops  ABDOMEN: Soft, Nontender, Nondistended; Bowel sounds present  EXTREMITIES:  2+ Peripheral Pulses, No clubbing, cyanosis, or edema  LYMPH: No lymphadenopathy noted  SKIN: No rashes or lesions    LABS:                        13.3   7.34  )-----------( 121      ( 01 Aug 2022 08:47 )             39.4     08-01    135  |  96  |  13  ----------------------------<  97  3.5   |  31  |  0.63    Ca    8.6      01 Aug 2022 08:47    TPro  6.2  /  Alb  2.5<L>  /  TBili  4.2<H>  /  DBili  x   /  AST  118<H>  /  ALT  36  /  AlkPhos  175<H>  08-01    PT/INR - ( 01 Aug 2022 08:47 )   PT: 15.5 sec;   INR: 1.33 ratio         PTT - ( 01 Aug 2022 08:47 )  PTT:40.7 sec    CAPILLARY BLOOD GLUCOSE                MEDICATIONS  (STANDING):  buPROPion XL (24-Hour) . 150 milliGRAM(s) Oral daily  folic acid 1 milliGRAM(s) Oral daily  furosemide    Tablet 40 milliGRAM(s) Oral daily  heparin   Injectable 5000 Unit(s) SubCutaneous every 12 hours  pantoprazole    Tablet 40 milliGRAM(s) Oral before breakfast  potassium chloride    Tablet ER 10 milliEquivalent(s) Oral daily  traZODone 100 milliGRAM(s) Oral at bedtime    MEDICATIONS  (PRN):      Care Discussed with Consultants/Other Providers [ ] YES  [ ] NO Patient is a 72y old  Female who presents with a chief complaint of B/L leg edema , difficulty ambulation , spouse can't take care at home (01 Aug 2022 09:06)      INTERVAL HPI/OVERNIGHT EVENTS: doing fair , no c/o  T(C): 36.9 (08-01-22 @ 20:46), Max: 37.2 (08-01-22 @ 10:24)  HR: 83 (08-01-22 @ 20:46) (76 - 83)  BP: 153/75 (08-01-22 @ 20:46) (110/69 - 153/75)  RR: 18 (08-01-22 @ 20:46) (17 - 18)  SpO2: 94% (08-01-22 @ 20:46) (93% - 100%)  Wt(kg): --  I&O's Summary    31 Jul 2022 07:01  -  01 Aug 2022 07:00  --------------------------------------------------------  IN: 1360 mL / OUT: 1950 mL / NET: -590 mL    01 Aug 2022 07:01  -  01 Aug 2022 23:26  --------------------------------------------------------  IN: 720 mL / OUT: 700 mL / NET: 20 mL        PAST MEDICAL & SURGICAL HISTORY:  Osteoarthritis of both knees      HTN (hypertension)      History of morbid obesity      HCC (hepatocellular carcinoma)  Stage III      H/O ETOH abuse      ETOH abuse      Anxiety and depression      Liver cirrhosis      Chronic lower back pain      History of subdural hematoma  s/p fall 12/06/2015 - stable, no intervention      Seasonal allergies      Morbid obesity  BMI 40.5      Portal 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 No nausea, vomiting, or hematemesis; No diarrhea or constipation. No melena or hematochezia.  GENITOURINARY: No dysuria, frequency, hematuria, or incontinence  NEUROLOGICAL: No headaches, memory loss, loss of strength, numbness, or tremors  SKIN: No itching, burning, rashes, or lesions   LYMPH NODES: No enlarged glands  ENDOCRINE: No heat or cold intolerance; No hair loss  MUSCULOSKELETAL: No joint pain or swelling; No muscle, back, or extremity pain  PSYCHIATRIC: No depression, anxiety, mood swings, or difficulty sleeping  HEME/LYMPH: No easy bruising, or bleeding gums  ALLERY AND IMMUNOLOGIC: No hives or eczema    RADIOLOGY & ADDITIONAL TESTS:    Imaging Personally Reviewed:  [ ] YES  [ ] NO    Consultant(s) Notes Reviewed:  [ ] YES  [ ] NO    PHYSICAL EXAM:  GENERAL: NAD, well-groomed, well-developed  HEAD:  Atraumatic, Normocephalic  EYES: EOMI, PERRLA, conjunctiva and sclera clear  ENMT: No tonsillar erythema, exudates, or enlargement; Moist mucous membranes, Good dentition, No lesions  NECK: Supple, No JVD, Normal thyroid  NERVOUS SYSTEM:  Alert & Oriented X3, Good concentration; Motor Strength 5/5 B/L upper and lower extremities; DTRs 2+ intact and symmetric  CHEST/LUNG: Clear to percussion bilaterally; No rales, rhonchi, wheezing, or rubs  HEART: Regular rate and rhythm; No murmurs, rubs, or gallops  ABDOMEN: Soft, Nontender, Nondistended; Bowel sounds present  EXTREMITIES:  2+ Peripheral Pulses, No clubbing, cyanosis, or edema  LYMPH: No lymphadenopathy noted  SKIN: No rashes or lesions    LABS:                        13.3   7.34  )-----------( 121      ( 01 Aug 2022 08:47 )             39.4     08-01    135  |  96  |  13  ----------------------------<  97  3.5   |  31  |  0.63    Ca    8.6      01 Aug 2022 08:47    TPro  6.2  /  Alb  2.5<L>  /  TBili  4.2<H>  /  DBili  x   /  AST  118<H>  /  ALT  36  /  AlkPhos  175<H>  08-01    PT/INR - ( 01 Aug 2022 08:47 )   PT: 15.5 sec;   INR: 1.33 ratio         PTT - ( 01 Aug 2022 08:47 )  PTT:40.7 sec    CAPILLARY BLOOD GLUCOSE                MEDICATIONS  (STANDING):  buPROPion XL (24-Hour) . 150 milliGRAM(s) Oral daily  folic acid 1 milliGRAM(s) Oral daily  furosemide    Tablet 40 milliGRAM(s) Oral daily  heparin   Injectable 5000 Unit(s) SubCutaneous every 12 hours  pantoprazole    Tablet 40 milliGRAM(s) Oral before breakfast  potassium chloride    Tablet ER 10 milliEquivalent(s) Oral daily  traZODone 100 milliGRAM(s) Oral at bedtime    MEDICATIONS  (PRN):      Care Discussed with Consultants/Other Providers [ ] YES  [ ] NO

## 2022-08-01 NOTE — PROGRESS NOTE ADULT - ASSESSMENT
A/P    B/L leg edema :  -started on IV lasix 40 mg IV bid : now d/c today by cardio   echo : shows EF75% , hyperdynamic LVF  cardio consult Dr. condon : following   venous doppler neg for any DVT    Hx of Hepatocellular carcinoma ( HCC )   -pt. being followed with [facility] / onchology   on active chemo therapy , last dose was few days ago     Unsteady gait / unable to ambulate :  -pt's spouse lives together , but now unable to take care 2/2 pt. became non ambulatory   will need PT eval and likely STR     Depression   -c/w welbutrin XL and trazodone     dispo: lasix d/c , PT eval done , recommend STR , plan for d/c to rehab in am     
A/P    B/L leg edema :  -now lasix 40 mg PO  echo : shows EF75% , hyperdynamic LVF  cardio consult Dr. condon : following   venous doppler neg for any DVT    Hypokalemia :  -replace K  started on 10 meq daily    Hx of Hepatocellular carcinoma ( HCC )   -pt. being followed with [facility] / onchology   on active chemo therapy , last dose was few days ago     Unsteady gait / unable to ambulate :  -pt's spouse lives together , but now unable to take care 2/2 pt. became non ambulatory   will need PT eval and likely STR     Depression   -c/w welbutrin XL and trazodone     dispo:  PT eval done , recommend STR , plan for d/c to rehab in am     
A/P    B/L leg edema :  -started on IV lasix 40 mg IV bid   check venous doppler / echo   cardio consult Dr. condon : following   echo : pending   venous doppler neg for any DVT    Hx of Hepatocellular carcinoma ( HCC )   -pt. being followed with [facility] / onchology   on active chemo therapy , last dose was few days ago     Unsteady gait / unable to ambulate :  -pt's spouse lives together , but now unable to take care 2/2 pt. became non ambulatory   will need PT eval and likely STR     Depression   -c/w welbutrin XL and trazodone     dispo: echo pending , PT eval     
A/P    B/L leg edema :  -started on IV lasix 40 mg IV bid : now d/c today by cardio   echo : shows EF75% , hyperdynamic LVF  cardio consult Dr. condon : following   venous doppler neg for any DVT    Hx of Hepatocellular carcinoma ( HCC )   -pt. being followed [facility] / onchology   on active chemo therapy , last dose was few days ago     Unsteady gait / unable to ambulate :  -pt's spouse lives together , but now unable to take care 2/2 pt. became non ambulatory   will need PT eval and likely STR     Depression   -c/w welbutrin XL and trazodone     dispo: lasix d/c , PT eval done , recommend STR , plan for d/c to rehab in am     
A/P    B/L leg edema :  -now lasix 40 mg PO  echo : shows EF75% , hyperdynamic LVF  cardio consult Dr. condon : following   venous doppler neg for any DVT    Hypokalemia :  -replace K  started on 10 meq daily    Hx of Hepatocellular carcinoma ( HCC )   -pt. being followed with [facility] / onchology   on active chemo therapy , last dose was few days ago     Unsteady gait / unable to ambulate :  -pt's spouse lives together , but now unable to take care 2/2 pt. became non ambulatory   will need PT eval and likely STR     Depression   -c/w welbutrin XL and trazodone     dispo:  PT eval done , recommend STR , plan for d/c to rehab , awaiting rehab bed availability    
A/P    B/L leg edema :  -started on IV lasix 40 mg IV bid   check venous doppler / echo   cardio consult Dr. condon     Hx of Hepatocellular carcinoma ( HCC )   -pt. being followed with [facility] / onchology   on active chemo therapy , last dose was few days ago     Unsteady gait / unable to ambulate :  -pt's spouse lives together , but now unable to take care 2/2 pt. became non ambulatory   will need PT eval and likely STR     Depression   -c/w welbutrin XL and trazodone     dispo: c/w diuresis , pending venous doppler and echo     
A/P    B/L leg edema :  -started on IV lasix 40 mg IV bid : now d/c today by cardio   echo : shows EF75% , hyperdynamic LVF  cardio consult Dr. condon : following   venous doppler neg for any DVT    Hx of Hepatocellular carcinoma ( HCC )   -pt. being followed with [facility] / onchology   on active chemo therapy , last dose was few days ago     Unsteady gait / unable to ambulate :  -pt's spouse lives together , but now unable to take care 2/2 pt. became non ambulatory   will need PT eval and likely STR     Depression   -c/w welbutrin XL and trazodone     dispo: lasix d/c , PT eval done , recommend STR , plan for d/c to rehab in am     
A/P    B/L leg edema :  -started on IV lasix 40 mg IV bid   check venous doppler / echo   cardio consult Dr. condon : following   echo : result reviewed , nml EF  venous doppler neg for any DVT    Hx of Hepatocellular carcinoma ( HCC )   -pt. being followed with [facility]chology   on active chemo therapy , last dose was few days ago     Unsteady gait / unable to ambulate :  -pt's spouse lives together , but now unable to take care 2/2 pt. became non ambulatory   will need PT eval and likely STR     Depression   -c/w welbutrin XL and trazodone     dispo: PT eval , may need STR on d/c .

## 2022-08-02 NOTE — DISCHARGE NOTE NURSING/CASE MANAGEMENT/SOCIAL WORK - PATIENT PORTAL LINK FT
You can access the FollowMyHealth Patient Portal offered by [facility] by registering at the following website: [email or web address]. By joining [employer]’s FollowMyHealth portal, you will also be able to view your health information using other applications (apps) compatible with our system.

## 2022-08-02 NOTE — DISCHARGE NOTE NURSING/CASE MANAGEMENT/SOCIAL WORK - NSDCPEFALRISK_GEN_ALL_CORE
For information on Fall & Injury Prevention, visit: [email or web address] OR  [email or web address] OR  https://www.cdc.gov/steadi/patient.html

## 2022-08-02 NOTE — PROGRESS NOTE ADULT - PROVIDER SPECIALTY LIST ADULT
Cardiology
Internal Medicine
Cardiology
Internal Medicine

## 2022-08-02 NOTE — PROGRESS NOTE ADULT - SUBJECTIVE AND OBJECTIVE BOX
Date of Service   08-02-22 @ 15:29    Patient is a 72y old  Female who presents with a chief complaint of B/L leg edema , difficulty ambulation , spouse can't take care at home (01 Aug 2022 19:24)      INTERVAL HISTORY: pt feels ok, anxious to go to rehab     REVIEW OF SYSTEMS:   CONSTITUTIONAL: No weakness  EYES/ENT: No visual changes; No throat pain  Neck: No pain or stiffness  Respiratory: No cough, wheezing, No shortness of breath  CARDIOVASCULAR: no chest pain or palpitations  GASTROINTESTINAL: No abdominal pain, no nausea, vomiting or hematemesis  GENITOURINARY: No dysuria, frequency or hematuria  NEUROLOGICAL: No stroke like symptoms  SKIN: No rashes    	  MEDICATIONS:  furosemide    Tablet 40 milliGRAM(s) Oral daily        PHYSICAL EXAM:  T(C): 37 (08-02-22 @ 13:01), Max: 37.1 (08-01-22 @ 17:02)  HR: 86 (08-02-22 @ 13:01) (77 - 86)  BP: 119/63 (08-02-22 @ 13:01) (119/63 - 153/75)  RR: 18 (08-02-22 @ 13:01) (18 - 18)  SpO2: 95% (08-02-22 @ 13:01) (92% - 95%)  Wt(kg): --  I&O's Summary    01 Aug 2022 07:01  -  02 Aug 2022 07:00  --------------------------------------------------------  IN: 720 mL / OUT: 700 mL / NET: 20 mL    02 Aug 2022 07:01  -  02 Aug 2022 15:29  --------------------------------------------------------  IN: 240 mL / OUT: 0 mL / NET: 240 mL          Appearance: In no distress	  HEENT:    PERRL, EOMI	  Cardiovascular:  S1 S2, No JVD  Respiratory: Lungs clear to auscultation	  Gastrointestinal:  Soft, Non-tender, + BS	  Vasculature:  + edema of LE  Psychiatric: Appropriate affect   Neuro: no acute focal deficits                               13.3   7.34  )-----------( 121      ( 01 Aug 2022 08:47 )             39.4     08-01    135  |  96  |  13  ----------------------------<  97  3.5   |  31  |  0.63    Ca    8.6      01 Aug 2022 08:47    TPro  6.2  /  Alb  2.5<L>  /  TBili  4.2<H>  /  DBili  x   /  AST  118<H>  /  ALT  36  /  AlkPhos  175<H>  08-01        Labs personally reviewed      ASSESSMENT/PLAN: 	    1. B/L leg edema : 2/2 HCC and less likely 2/2 heart failure as no associated SOB  - proBNP 207  - CXR with normal heart size, no pneumothorax, pleural effusion or consolidation  - check venous doppler - negative for DVT/ TTE shows EF >75%, hyperdynamic LV systolic function, normal RV  - IV lasix 40 mg IV bid d/c'd by [facility] 7/28   - LE edema mildly improving  - c/w Lasix 40mg PO daily as outpatient    2. Hx of Hepatocellular carcinoma ( HCC )   - pt. being followed with [facility]  - on active chemo therapy , last dose was few days ago   - Plan for immunotherapy pending whether or not she will still be in [facility]    3. Unsteady gait / unable to ambulate :  - will need PT eval and likely STR   - Plan for [facility]         Kathycastrobrady [facility]   Geoffrey Batista DO [facility]  Cardiovascular Medicine  [address] [place] [address], [address]  Office: [phone]

## 2022-08-02 NOTE — PROGRESS NOTE ADULT - REASON FOR ADMISSION
B/L leg edema , difficulty ambulation , spouse can't take care at home

## 2022-08-02 NOTE — DISCHARGE NOTE NURSING/CASE MANAGEMENT/SOCIAL WORK - NSDCVIVACCINE_GEN_ALL_CORE_FT
Tdap; 06-Dec-2015 00:10; Marisol Bailey (RN); [employer]; [id number]; IntraMuscular; Deltoid Left.; 0.5 milliLiter(s); VIS (VIS Published: 09-May-2013, VIS Presented: 06-Dec-2015);

## 2022-08-17 PROBLEM — C22.0 HEPATOCELLULAR CARCINOMA: Status: ACTIVE | Noted: 2021-01-01

## 2022-08-17 NOTE — PHYSICAL EXAM
[Capable of only limited self care, confined to bed or chair more than 50% of waking hours] : Status 3- Capable of only limited self care, confined to bed or chair more than 50% of waking hours [Normal] : affect appropriate [de-identified] : b/l LE edema [de-identified] : limited ROM in lower extremities  [de-identified] : flat affect

## 2022-08-17 NOTE — HISTORY OF PRESENT ILLNESS
[Disease: _____________________] : Disease: [unfilled] [AJCC Stage: ____] : AJCC Stage: [unfilled] [Therapy: ___] : Therapy: [unfilled] [Cycle: ___] : Cycle: [unfilled] [Day: ___] : Day: [unfilled] [de-identified] : 70 y/o F w/ hx of obesity, WILEY/alcohol associated cirrhosis, s/p L knee replacement with resulting physical debility referred for further evaluation of advanced HCC. \par \par Maryanne was found to have elevated LFTs which prompted an MRI abd on 12/14/21 which showed cirrhosis with evidence of paraesophageal splenorenal varices, thrombosis of the R portal vein, suspicious enhancing nodule in segment 8. Patchy infiltrative arterial phase enhancement throughout segment 5, 8, 4A, 4B measuring 10.7 x 7.4 cm with suggestion of washout on portal venous and equilibrium phase highly suspicious for infiltrative HCC with restricted diffusion.\par \par Referred to medical oncology for further evaluation. \par \par 2/7/22: SIRT to R lobe\par 5/12/22; MRI Abd: demonstrates slightly decreased overall hypervascularity associated with extensive infiltrative enhancing tumor with washout, more extensive in the right lobe, especially segments 5, 8 and 4, when compared with pretreatment MRI. A 1.6 cm T2 hyperintense cystic focus in the right lobe has decreased in size.\par 6/20-7/18/22: C1-C2 Nivolumab\par 7/24-8/2/22: [facility] admission due to b/l LE edema 2/2 HCC and 3. Unsteady gait / unable to ambulate; discharged to [facility]\par 8/15/22: C3 Nivolumab [de-identified] : n/a [de-identified] : Patient was seen at the infusion room while receiving treatment. She continues to stay at a rehab facility: s/p [facility] admission due to b/l LE edema and unsteady gait. Her b/l lower extremities remain swollen (continue diuretics as directed). She makes a conscious effort to maintain stable appetite + weight (mild nausea noted), denied inconsistent bowel movements at this time (uses docusate for constipation relief).

## 2022-09-07 NOTE — ASU PATIENT PROFILE, ADULT - FALL HARM RISK - RISK INTERVENTIONS

## 2022-09-07 NOTE — PROCEDURE NOTE - PROCEDURE FINDINGS AND DETAILS
Successful Chest Port Placement with placement of an 8F port with the tip at the superior cavoatrial junction. Ready for use.

## 2022-09-07 NOTE — PRE PROCEDURE NOTE - PRE PROCEDURE EVALUATION
Interventional Radiology    HPI: 72y Female with history of HCC, s/p SIRT in februrary, now requiring chest port for chemotherapy.     Allergies: penicillin (Anaphylaxis)    Medications (Abx/Cardiac/Anticoagulation/Blood Products)      Data:  162.6  110  T(C): 36.7  HR: 85  BP: 116/60  RR: 16  SpO2: 95%    Exam  General: No acute distress  Chest: Non labored breathing  Abdomen: Non-distended  Extremities: No swelling, warm    Plan:   72y Female with history of HCC, s/p SIRT in februrary, now requiring chest port for chemotherapy.   -- Relevant imaging and labs were reviewed.   -- Risks, benefits, and alternatives were explained to the patient and informed consent was obtained.

## 2022-09-07 NOTE — ASU DISCHARGE PLAN (ADULT/PEDIATRIC) - NS MD DC FALL RISK RISK
For information on Fall & Injury Prevention, visit: [email or web address] OR  [email or web address] OR  https://www.cdc.gov/steadi/patient.html

## 2022-09-07 NOTE — ASU PREOP CHECKLIST - HEIGHT IN CM
Casie call this evening with concern regarding constipation. She is experiencing this problem several times each week.  She has been taking the Miralax with relief, however does get nauseated drinking it on an empty stomach, which she has been doing since Ramadan.  She reports she is drinking a lots of water throughout the day and eating fibrous foods.  I offered her a refill of Colace, which she accepted.    She also c/o abdominal pain when walking and changing positions. She did speak with Daija about this who explained normal physiological changes during pregnancy and recommended Tylenol as needed. She would like a prescription for Tylenol. This was also provided.      No further questions.    
162.56

## 2022-09-07 NOTE — ASU DISCHARGE PLAN (ADULT/PEDIATRIC) - ASU DC SPECIAL INSTRUCTIONSFT
Procedure: Chest Port Placement    Chest Port Placement    Discharge Instructions  - You have had a chest port implanted in your chest.   - The port is ready for use.  - You may shower in 72 hours. No soaking or swimming for 2 weeks or until the site is completely healed.  - Keep the area covered and dry for the next 7 days. It may be removed by a chemotherapy nurse as needed for treatment.  - Do not perform any heavy lifting or put tension on the area for the next week or until the site is healed.  - The skin glue (Dermabond) on your skin will act as a scab, allow it to fall off on its own over the next 1-3 weeks.  - You may resume your normal diet.  - You may resume your normal medications however you should wait 48 hours before restarting aspirin, plavix, or blood thinners.  - It is normal to experience some pain over the site for the next few days. You may take apply ice to the area (20 minutes on, 20 minutes off) and take Tylenol for that pain. Do not take more frequently than every 6 hours and do not exceed more than 3000mg of Tylenol in a 24 hour period.    - You were given conscious sedation which may make you drowsy, therefore you need someone to stay with you until the morning following the procedure.  - Do not drive, engage in heavy lifting or strenuous activity, or drink any alcoholic beverages for the next 24 hours.   - You may resume normal activity in 24 hours.    Notify your primary physician and/or Interventional Radiology IMMEDIATELY if you experience any of the following       - Fever of 101F or 38C       - Chills or Rigors/ Shakes       - Swelling and/or Redness in the area around the port       - Worsening Pain       - Blood soaked bandages or worsening bleeding       - Lightheadedness and/or dizziness upon standing       - Chest Pain/ Tightness       - Shortness of Breath       - Difficulty walking    If you have a problem that you believe requires IMMEDIATE attention, please go to your NEAREST Emergency Room. If you believe your problem can safely wait until you speak to a physician, please call Interventional Radiology for any concerns.    During Normal Weekday Business Hours- You can contact the Interventional Radiology department during normal business hours via telephone.  During Evenings and Weekends- If you need to contact Interventional Radiology during off hours, do so by calling the hospital and requesting to be connected to the Interventional Radiologist on call.

## 2022-09-12 NOTE — REVIEW OF SYSTEMS
[Fatigue] : fatigue [Muscle Weakness] : muscle weakness [Difficulty Walking] : difficulty walking [Negative] : Gastrointestinal

## 2022-09-12 NOTE — HISTORY OF PRESENT ILLNESS
[de-identified] : 71 y/o F w/ hx of obesity, WILEY/alcohol associated cirrhosis, s/p L knee replacement with resulting physical debility referred for further evaluation of advanced HCC. \par \par Maryanne was found to have elevated LFTs which prompted an MRI abd on 12/14/21 which showed cirrhosis with evidence of paraesophageal splenorenal varices, thrombosis of the R portal vein, suspicious enhancing nodule in segment 8. Patchy infiltrative arterial phase enhancement throughout segment 5, 8, 4A, 4B measuring 10.7 x 7.4 cm with suggestion of washout on portal venous and equilibrium phase highly suspicious for infiltrative HCC with restricted diffusion.\par \par Referred to medical oncology for further evaluation. \par \par 2/7/22: SIRT to R lobe\par 5/12/22; MRI Abd: demonstrates slightly decreased overall hypervascularity associated with extensive infiltrative enhancing tumor with washout, more extensive in the right lobe, especially segments 5, 8 and 4, when compared with pretreatment MRI. A 1.6 cm T2 hyperintense cystic focus in the right lobe has decreased in size.\par 6/20-7/18/22: C1-C2 Nivolumab\par 7/24-8/2/22: [facility] [place] [facility] due to b/l LE edema 2/2 HCC and 3. Unsteady gait / unable to ambulate; discharged to [facility]\par 8/15/22: C3 Nivolumab. \par 9/12/22: C4 Nivolumab\par \par Disease: HCC \par Pathology: n/a \par AJCC Stage: III  [Therapy: ___] : Therapy: [unfilled] [Cycle: ___] : Cycle: [unfilled] [de-identified] : Since last visit Ms. Tamayo went to hospital for bilateral lower extremity edema, was found to have difficulty ambulating and subsequently evaluated by physical therapy and discharged to [facility] facility. Her labs were significant for T. bili of 7 (8/15/22) which has steadily come down and was 4.7 on 8/30/22. She presents today for next cycle of Nivolumab. She is s/p port placement last week, has no complaints at this time but appears weak and sitting in wheelchair.

## 2022-09-12 NOTE — ASSESSMENT
[FreeTextEntry1] : Hepatocellular carcinoma (155.0) (C22.0)\par  · 72 F w/ ECOG 2-3 due to OA of knees, cryptogenic cirrhosis, advanced HCC s/p SIRT in\par     Feb 2022, with extensive infiltrative bilobar disease, presents for further management,\par     currently on single agent Nivolumab 480 mg monthly \par     \par     - T bili downtrending (4.7 on 8/30/22 from 7.0 8/15/22), will repeat labs today\par     - tolerating tx at this time, C4 today. \par     - plan to request repeat imaging to assess for tx response. \par     - Continue supportive care (pain management, antiemetics, bowel regimen) PRN. \par     - Additional labs requested today. Plan to repeat CMP next week. \par     - Mediport placement completed 9/7\par     - Monitor for toxicity and physical activity as tolerated. \par     - All concerns and questions were addressed and answered in full detail to the patient and 's\par     apparent satisfaction and agreement.\par     - RTC in 1 month\par

## 2022-09-12 NOTE — PHYSICAL EXAM
[Capable of only limited self care, confined to bed or chair more than 50% of waking hours] : Status 3- Capable of only limited self care, confined to bed or chair more than 50% of waking hours [Obese] : obese [Normal] : normoactive bowel sounds, soft and nontender, no hepatosplenomegaly or masses appreciated [de-identified] : chronically ill appearing [de-identified] : + [de-identified] : + lower extremity b/l edema

## 2022-12-01 NOTE — ASU PREOP CHECKLIST - NS PREOP CHK MONITOR ANESTHESIA CONSENT
Pharmacy Pop Care Documentation:      The application for Padmini Mclean for enrollment into the diabetes management program has been reviewed and accepted on 12/1/22.     [id number] in place:  No  Gap Closed?: Yes   Time Spent (min): 5
done

## 2022-12-05 ENCOUNTER — APPOINTMENT (OUTPATIENT)
Dept: INFUSION THERAPY | Facility: HOSPITAL | Age: 72
End: 2022-12-05

## 2023-11-07 NOTE — ASU PATIENT PROFILE, ADULT - BRAND OF COVID-19 VACCINATION
Pfizer dose 1 and 2 Griseofulvin Pregnancy And Lactation Text: This medication is Pregnancy Category X and is known to cause serious birth defects. It is unknown if this medication is excreted in breast milk but breast feeding should be avoided. Admission

## 2023-11-14 NOTE — H&P PST ADULT - BP NONINVASIVE SYSTOLIC (MM HG)
Tolerated infusion well. Reviewed therapy plan, offered education material and/or discharge material, reviewed medication information and signs and symptoms  and educated on possible side effects, verbalizes good knowledge of current plan patient verbalizes understanding, and has no signs or symptoms to report at this time. Patient discharged. Patient alert and oriented x3. No distress noted. Vital signs stable. Patient denies any new or worsening pain. Patient denies any needs. All questions answered. Declines copy of AVS. Patient stayed for 30 minute observation after completion of infusion. 156

## 2023-12-22 NOTE — ASU DISCHARGE PLAN (ADULT/PEDIATRIC) - ASU DISCHARGE DATE/TIME
1. Caller Name: dad                        Call Back Number: [phone] (home)         How would the patient prefer to be contacted with a response: Phone call OK to leave a detailed message    Father called office wanted to know if FMLA paperwork was ready for him to . Call dad back  
Called and LVM; explained difficulties in completing FMLA paperwork for unknown appt times and work to be missed. Tried to explain on documentation as well. Encouraged dad to either complete paperwork and/or d/w employer.  
07-Feb-2022 14:30

## 2024-05-08 NOTE — ASU PATIENT PROFILE, ADULT - NSTOBACCONEVERSMOKERY/N_GEN_A
Ultrasound of bilateral lower extremities are normal and has no evidence of any DVTs    This is pointing towards the possibility of the amlodipine causing his signs/symptoms of swelling or even a heart condition    I would recommend he follows up with myself at his scheduled appointment on 5/14 of next week No

## 2024-06-17 NOTE — ASU PREOP CHECKLIST - LATEX ALLERGY
Called and left message for parent/guardian offering appointment on 6/20 @ 3PM VIRTUAL. Please schedule upon return call. Thank you.  
no

## 2024-09-04 NOTE — H&P PST ADULT - RESPIRATORY
methotrexate 2.5 MG tablet 24 tablet 1 6/25/2024     predniSONE (DELTASONE) 2.5 MG tablet 20 tablet 0 8/19/2024         Last Office Visit : 6/25/24  Future Office visit:  9/10/24    Routing refill request to provider for review/approval because:  Provider to authorize      - Prednisone 2.5mg pills instructions:              - Week 1: Alternate 4 pills daily with 3 pills daily              - Week 2: Take 3 pills daily              - Week 3: Alternate 3 pills daily with 2 pills daily              - Week 4 and beyond: Take 2 pills daily until you return to clinic  - Continue methotrexate 15 mg weekly.   detailed exam

## 2024-12-04 NOTE — H&P ADULT - HISTORY OF PRESENT ILLNESS
The patient is a 71y Female complaining of acute on chronic leg swelling. Pt has a hx of HCC s/p radioembolization 2/2022 and currently undergoing tx with nivolumab (last infusion 2 days prior to presentation). States that for the past 3 days has been unable to walk 2/2 leg swelling and pain. Also notes fluid weeping especially from right leg. Patient states that she takes 20mg lasix per day, did take her dose today. Patient usually under the care of her  who states that he cannot effectively care for her if she is unable to walk.  denies any SOB , no CP , no fever/ chills  Performing Laboratory: [phone]

## (undated) DEVICE — SUCTION YANKAUER NO CONTROL VENT

## (undated) DEVICE — PACK IV START WITH CHG

## (undated) DEVICE — SYR ALLIANCE II INFLATION 60ML

## (undated) DEVICE — SENSOR O2 FINGER ADULT 24/CA

## (undated) DEVICE — IRRIGATOR BIO SHIELD

## (undated) DEVICE — BITE BLOCK ADULT 20 X 27MM (GREEN)

## (undated) DEVICE — ELCTR GROUNDING PAD ADULT COVIDIEN

## (undated) DEVICE — FORCEP RADIAL JAW 4 240CM DISP

## (undated) DEVICE — SOL INJ NS 0.9% 500ML 2 PORT

## (undated) DEVICE — BIOPSY FORCEP RADIAL JAW 4 STANDARD WITH NEEDLE

## (undated) DEVICE — SNARE EXACTO COLD 9MMX230CM

## (undated) DEVICE — CATH IV SAFE BC 22G X 1" (BLUE)

## (undated) DEVICE — BALLOON US ENDO

## (undated) DEVICE — TUBING SUCTION CONN 6FT STERILE

## (undated) DEVICE — TUBING SUCTION 20FT

## (undated) DEVICE — BRUSH COLONOSCOPY CYTOLOGY

## (undated) DEVICE — FORCEP RADIAL JAW 4 JUMBO 2.8MM 3.2MM 240CM ORANGE DISP

## (undated) DEVICE — SYR LUER LOK 50CC

## (undated) DEVICE — TUBING IV SET GRAVITY 3Y 100" MACRO

## (undated) DEVICE — CLAMP BX HOT RAD JAW 3

## (undated) DEVICE — POLY TRAP ETRAP

## (undated) DEVICE — CATH IV SAFE BC 20G X 1.16" (PINK)

## (undated) DEVICE — FOLEY HOLDER STATLOCK 2 WAY ADULT